# Patient Record
Sex: FEMALE | Race: WHITE | NOT HISPANIC OR LATINO | URBAN - METROPOLITAN AREA
[De-identification: names, ages, dates, MRNs, and addresses within clinical notes are randomized per-mention and may not be internally consistent; named-entity substitution may affect disease eponyms.]

---

## 2020-06-01 ENCOUNTER — INPATIENT (INPATIENT)
Facility: HOSPITAL | Age: 85
LOS: 3 days | Discharge: HOME | DRG: 291 | End: 2020-06-05
Attending: GENERAL ACUTE CARE HOSPITAL | Admitting: STUDENT IN AN ORGANIZED HEALTH CARE EDUCATION/TRAINING PROGRAM
Payer: MEDICARE

## 2020-06-01 VITALS
DIASTOLIC BLOOD PRESSURE: 77 MMHG | HEART RATE: 92 BPM | WEIGHT: 145.06 LBS | RESPIRATION RATE: 16 BRPM | HEIGHT: 63 IN | TEMPERATURE: 99 F | SYSTOLIC BLOOD PRESSURE: 122 MMHG | OXYGEN SATURATION: 100 %

## 2020-06-01 DIAGNOSIS — E87.5 HYPERKALEMIA: ICD-10-CM

## 2020-06-01 LAB
ALBUMIN SERPL ELPH-MCNC: 3.6 G/DL — SIGNIFICANT CHANGE UP (ref 3.3–5.2)
ALP SERPL-CCNC: 84 U/L — SIGNIFICANT CHANGE UP (ref 40–120)
ALT FLD-CCNC: 22 U/L — SIGNIFICANT CHANGE UP
ANION GAP SERPL CALC-SCNC: 10 MMOL/L — SIGNIFICANT CHANGE UP (ref 5–17)
APTT BLD: 29.6 SEC — SIGNIFICANT CHANGE UP (ref 27.5–36.3)
AST SERPL-CCNC: 25 U/L — SIGNIFICANT CHANGE UP
BASOPHILS # BLD AUTO: 0.03 K/UL — SIGNIFICANT CHANGE UP (ref 0–0.2)
BASOPHILS NFR BLD AUTO: 0.5 % — SIGNIFICANT CHANGE UP (ref 0–2)
BILIRUB SERPL-MCNC: 0.3 MG/DL — LOW (ref 0.4–2)
BUN SERPL-MCNC: 54 MG/DL — HIGH (ref 8–20)
CALCIUM SERPL-MCNC: 8.8 MG/DL — SIGNIFICANT CHANGE UP (ref 8.6–10.2)
CHLORIDE SERPL-SCNC: 108 MMOL/L — HIGH (ref 98–107)
CO2 SERPL-SCNC: 26 MMOL/L — SIGNIFICANT CHANGE UP (ref 22–29)
CREAT SERPL-MCNC: 2.32 MG/DL — HIGH (ref 0.5–1.3)
CRP SERPL-MCNC: 0.85 MG/DL — HIGH (ref 0–0.4)
EOSINOPHIL # BLD AUTO: 0.26 K/UL — SIGNIFICANT CHANGE UP (ref 0–0.5)
EOSINOPHIL NFR BLD AUTO: 4.7 % — SIGNIFICANT CHANGE UP (ref 0–6)
FERRITIN SERPL-MCNC: 51 NG/ML — SIGNIFICANT CHANGE UP (ref 15–150)
GLUCOSE SERPL-MCNC: 99 MG/DL — SIGNIFICANT CHANGE UP (ref 70–99)
HCT VFR BLD CALC: 32.3 % — LOW (ref 34.5–45)
HGB BLD-MCNC: 9.5 G/DL — LOW (ref 11.5–15.5)
IMM GRANULOCYTES NFR BLD AUTO: 0.4 % — SIGNIFICANT CHANGE UP (ref 0–1.5)
INR BLD: 1.54 RATIO — HIGH (ref 0.88–1.16)
LACTATE BLDV-MCNC: 1.1 MMOL/L — SIGNIFICANT CHANGE UP (ref 0.5–2)
LYMPHOCYTES # BLD AUTO: 1.06 K/UL — SIGNIFICANT CHANGE UP (ref 1–3.3)
LYMPHOCYTES # BLD AUTO: 19.2 % — SIGNIFICANT CHANGE UP (ref 13–44)
MCHC RBC-ENTMCNC: 29.4 GM/DL — LOW (ref 32–36)
MCHC RBC-ENTMCNC: 30.2 PG — SIGNIFICANT CHANGE UP (ref 27–34)
MCV RBC AUTO: 102.5 FL — HIGH (ref 80–100)
MONOCYTES # BLD AUTO: 0.66 K/UL — SIGNIFICANT CHANGE UP (ref 0–0.9)
MONOCYTES NFR BLD AUTO: 11.9 % — SIGNIFICANT CHANGE UP (ref 2–14)
NEUTROPHILS # BLD AUTO: 3.5 K/UL — SIGNIFICANT CHANGE UP (ref 1.8–7.4)
NEUTROPHILS NFR BLD AUTO: 63.3 % — SIGNIFICANT CHANGE UP (ref 43–77)
NT-PROBNP SERPL-SCNC: HIGH PG/ML (ref 0–300)
PLATELET # BLD AUTO: 177 K/UL — SIGNIFICANT CHANGE UP (ref 150–400)
POTASSIUM SERPL-MCNC: 6.4 MMOL/L — CRITICAL HIGH (ref 3.5–5.3)
POTASSIUM SERPL-SCNC: 6.4 MMOL/L — CRITICAL HIGH (ref 3.5–5.3)
PROCALCITONIN SERPL-MCNC: 0.1 NG/ML — SIGNIFICANT CHANGE UP (ref 0.02–0.1)
PROT SERPL-MCNC: 6.8 G/DL — SIGNIFICANT CHANGE UP (ref 6.6–8.7)
PROTHROM AB SERPL-ACNC: 17.6 SEC — HIGH (ref 10–12.9)
RBC # BLD: 3.15 M/UL — LOW (ref 3.8–5.2)
RBC # FLD: 17.5 % — HIGH (ref 10.3–14.5)
SODIUM SERPL-SCNC: 143 MMOL/L — SIGNIFICANT CHANGE UP (ref 135–145)
TROPONIN T SERPL-MCNC: 0.04 NG/ML — SIGNIFICANT CHANGE UP (ref 0–0.06)
WBC # BLD: 5.53 K/UL — SIGNIFICANT CHANGE UP (ref 3.8–10.5)
WBC # FLD AUTO: 5.53 K/UL — SIGNIFICANT CHANGE UP (ref 3.8–10.5)

## 2020-06-01 PROCEDURE — 71045 X-RAY EXAM CHEST 1 VIEW: CPT | Mod: 26

## 2020-06-01 PROCEDURE — 93010 ELECTROCARDIOGRAM REPORT: CPT

## 2020-06-01 PROCEDURE — 99223 1ST HOSP IP/OBS HIGH 75: CPT | Mod: GC

## 2020-06-01 PROCEDURE — 99497 ADVNCD CARE PLAN 30 MIN: CPT

## 2020-06-01 PROCEDURE — 99291 CRITICAL CARE FIRST HOUR: CPT | Mod: CS

## 2020-06-01 RX ORDER — AZITHROMYCIN 500 MG/1
500 TABLET, FILM COATED ORAL ONCE
Refills: 0 | Status: COMPLETED | OUTPATIENT
Start: 2020-06-01 | End: 2020-06-01

## 2020-06-01 RX ORDER — INSULIN HUMAN 100 [IU]/ML
10 INJECTION, SOLUTION SUBCUTANEOUS ONCE
Refills: 0 | Status: COMPLETED | OUTPATIENT
Start: 2020-06-01 | End: 2020-06-01

## 2020-06-01 RX ORDER — DEXTROSE 50 % IN WATER 50 %
50 SYRINGE (ML) INTRAVENOUS ONCE
Refills: 0 | Status: COMPLETED | OUTPATIENT
Start: 2020-06-01 | End: 2020-06-01

## 2020-06-01 RX ORDER — SODIUM BICARBONATE 1 MEQ/ML
50 SYRINGE (ML) INTRAVENOUS ONCE
Refills: 0 | Status: COMPLETED | OUTPATIENT
Start: 2020-06-01 | End: 2020-06-01

## 2020-06-01 RX ORDER — CEFTRIAXONE 500 MG/1
1000 INJECTION, POWDER, FOR SOLUTION INTRAMUSCULAR; INTRAVENOUS ONCE
Refills: 0 | Status: COMPLETED | OUTPATIENT
Start: 2020-06-01 | End: 2020-06-01

## 2020-06-01 RX ORDER — FUROSEMIDE 40 MG
40 TABLET ORAL ONCE
Refills: 0 | Status: COMPLETED | OUTPATIENT
Start: 2020-06-01 | End: 2020-06-01

## 2020-06-01 RX ORDER — ACETAMINOPHEN 500 MG
650 TABLET ORAL EVERY 6 HOURS
Refills: 0 | Status: DISCONTINUED | OUTPATIENT
Start: 2020-06-01 | End: 2020-06-05

## 2020-06-01 RX ORDER — ACETAMINOPHEN 500 MG
975 TABLET ORAL ONCE
Refills: 0 | Status: COMPLETED | OUTPATIENT
Start: 2020-06-01 | End: 2020-06-01

## 2020-06-01 RX ADMIN — Medication 40 MILLIGRAM(S): at 22:47

## 2020-06-01 RX ADMIN — CEFTRIAXONE 1000 MILLIGRAM(S): 500 INJECTION, POWDER, FOR SOLUTION INTRAMUSCULAR; INTRAVENOUS at 22:47

## 2020-06-01 RX ADMIN — CEFTRIAXONE 100 MILLIGRAM(S): 500 INJECTION, POWDER, FOR SOLUTION INTRAMUSCULAR; INTRAVENOUS at 22:40

## 2020-06-01 RX ADMIN — INSULIN HUMAN 10 UNIT(S): 100 INJECTION, SOLUTION SUBCUTANEOUS at 22:48

## 2020-06-01 RX ADMIN — Medication 975 MILLIGRAM(S): at 21:44

## 2020-06-01 RX ADMIN — Medication 50 MILLILITER(S): at 22:49

## 2020-06-01 RX ADMIN — Medication 50 MILLIEQUIVALENT(S): at 22:49

## 2020-06-01 RX ADMIN — AZITHROMYCIN 255 MILLIGRAM(S): 500 TABLET, FILM COATED ORAL at 21:44

## 2020-06-01 RX ADMIN — AZITHROMYCIN 500 MILLIGRAM(S): 500 TABLET, FILM COATED ORAL at 22:39

## 2020-06-01 NOTE — H&P ADULT - ATTENDING COMMENTS
89yoF hx dementia Afib on Eliquis, hypothyroidism, HLD sent from [facility] for CXR concerning for PNA which appears to have been done after pt had been complaining of dyspnea for several days.  Pt reports chronic bilateral leg swelling but is unclear if its been worsening.  Pt reports being on water pills long ago, unsure when or why it was discontinued.  She does not recall any prior hx of CKD. Pt found to be hypoxic on admission at 83%, with elevated proBNP signs of pulmonary edema on CXR.  No fever, leukocytosis and high procal to support PNA.   Labs notable for K+ 6.4 w/out EKG changes; in ED received empiric CAP coverage, IV insulin, IV Lasix, NaHCO3.    Admit for acute hypoxemic respiratory failure due to decompensated CHF, unknown subtype.  Plan for IV diuresis, TTE, cardiology evaluation.  Also hyperkalemia in setting of MONA on presumed advanced CKD.  Nephrology consulted given presumed MONA on likely CKD.  Also consulted palliative as pt to help clarify advanced directives.  Pt repeatedly deferring to melvin Buenrostro ([phone]) who PGY3 called by unable to reach overnight.     5AM update: Repeat STAT BMP had been ordered ~11:50PM on 6/1, communicated to ED RN Jeff Dia but there had been multiple delays in obtaining it so escalated to ED RN manager and called for critical result of 6.5 after 5AM. Will give Ca gluconate, insulin (humulin at 5U given serum glucose of 67), dextrose, Kayexalate, albuterol.  Repeat BMP ordered for 9AM. 89yoF hx dementia Afib on Eliquis, hypothyroidism, HLD sent from [facility] for CXR concerning for PNA which appears to have been done after pt had been complaining of dyspnea for several days.  Pt reports chronic bilateral leg swelling but is unclear if its been worsening.  Pt reports being on water pills long ago, unsure when or why it was discontinued.  She does not recall any prior hx of CKD. Pt found to be hypoxic on admission at 83%, with elevated proBNP signs of pulmonary edema on CXR.  No fever, leukocytosis and high procal to support PNA.   Labs notable for K+ 6.4 w/out EKG changes; in ED received empiric CAP coverage, IV insulin, IV Lasix, NaHCO3.    Admit for acute hypoxemic respiratory failure due to decompensated CHF, unknown subtype.  Plan for IV diuresis, TTE, cardiology evaluation.  Also hyperkalemia in setting of MONA on presumed advanced CKD.  Nephrology consulted given presumed MONA on likely CKD.  Also consulted palliative as pt to help clarify advanced directives.  Pt does not feel she can make her own decision regarding code status and repeatedly deferring to melvin Buenrostro ([phone]) who PGY3 called by unable to reach overnight.     5AM update: Repeat STAT BMP had been ordered ~11:50PM on 6/1, communicated to ED RN Jeff Dia but there had been multiple delays in obtaining it so escalated to ED RN manager and called for critical result of K+6.5 after 5AM. Will give Ca gluconate, insulin (humulin at 5U given serum glucose of 67), dextrose, Kayexalate, albuterol.  Repeat BMP ordered for 9AM.

## 2020-06-01 NOTE — H&P ADULT - REASON FOR ADMISSION
SOB acute hypoxemic respiratory failure due to decompensated CHF acute hypoxemic respiratory failure due to decompensated CHF, hyperkalemia

## 2020-06-01 NOTE — H&P ADULT - NSHPPHYSICALEXAM_GEN_ALL_CORE
Vital Signs Last 24 Hrs  T(C): 36.3 (01 Jun 2020 21:44), Max: 37.2 (01 Jun 2020 20:52)  T(F): 97.4 (01 Jun 2020 21:44), Max: 99 (01 Jun 2020 20:52)  HR: 92 (01 Jun 2020 20:52) (92 - 92)  BP: 122/77 (01 Jun 2020 20:52) (122/77 - 122/77)  RR: 20 (01 Jun 2020 21:35) (16 - 20)  SpO2: 97% (01 Jun 2020 21:35) (83% - 100%)    GENERAL: NAD, well-groomed, well-developed  HEAD:  Atraumatic, Normocephalic  EYES: PERRLA, conjunctiva and sclera clear  ENMT: No tonsillar erythema, exudates, or enlargement, dry mucous   NECK: Supple  Respiratory: positive rales BL over both lung fields , no  rhonchi, wheezing, or rubs  CV: Irregular - Irregular ,  No murmurs, rubs, or gallops  Gastrointestinal: Soft, Nontender, Nondistended; Bowel sounds present  EXTREMITIES:  2+ Peripheral Pulses , + 2 edema over LE up to the knees   NERVOUS SYSTEM:  Alert & Oriented X2, Motor Strength 5/5 B/L upper and lower extremities  SKIN: sacral ulcer stage 2 Vital Signs Last 24 Hrs  T(C): 36.3 (01 Jun 2020 21:44), Max: 37.2 (01 Jun 2020 20:52)  T(F): 97.4 (01 Jun 2020 21:44), Max: 99 (01 Jun 2020 20:52)  HR: 92 (01 Jun 2020 20:52) (92 - 92)  BP: 122/77 (01 Jun 2020 20:52) (122/77 - 122/77)  RR: 20 (01 Jun 2020 21:35) (16 - 20)  SpO2: 97% (01 Jun 2020 21:35) (83% - 100%)    GENERAL: NAD, well-groomed, well-developed  HEAD:  Atraumatic, Normocephalic  EYES: PERRLA, conjunctiva and sclera clear  ENMT: No tonsillar erythema, exudates, or enlargement, dry mucous   NECK: Supple  Respiratory: positive rales BL over both lung fields , no  rhonchi, wheezing, or rubs  CV: Irregular - Irregulary ,  No murmurs, rubs, or gallops  Gastrointestinal: Soft, Nontender, Nondistended; Bowel sounds present  EXTREMITIES:  2+ Peripheral Pulses , + 2 edema over LE up to the knees   NERVOUS SYSTEM:  Alert & Oriented X2, Motor Strength 5/5 B/L upper and lower extremities  SKIN: sacral ulcer stage 2

## 2020-06-01 NOTE — H&P ADULT - ASSESSMENT
Pt is a 89 yo F w/pmh of dementia , Afib on eliquis , HLD , hypothyroidism and depression , pt BIBA from [facility] after CXR showing PNA . In the ER pt found to be in hypoxic respiratory failure most likely 2/2 to CHF, hyperkalemia and elevated Cr and BUN. Pt will be admitted for evaluation and treatment .     1-Hypoxic respiratory failure 2/2 to CHF unknown EF   admit to medicine , telem bed  CXR w/ cardiomegaly , congestion and BL small pleural effusion   BNP>1700  positive rales and LE edema on Physical exam   s/p rocephin/azit in the ER   low suspicion for PNA ( wbc and procal wnl and  no fever )  cardiology consulted [facility]  s/p one dose of lasix 40 IV   c/w lasix 40 mg QD  daily wt, fluid restriction   trop nega  TTE ordered     2-Hyperkalemia  s/p IV insulin , D50% , bicar and lasix  repeat BMP ordered .     3-MONA vs CKD  elevated Cr and BUN  unknown renal  baseline   renal US ordered   Nephrology consulted     4-Macrocytic anemia   Hb= 9.5  MCV= 102  will check folate and vit B12 levels     5-Afib   chronic   c/w eliquis 2.5 BID  c/w metoprolol succ    6- Depression   c/w mirtazapine at bed time  PRN temazepam     7-Hypothyroidism   c/w levothy     8- Preventive measure   DVT proph: c/w eliquis BID    Case discuss w/ Attending Pt is a 89 yo F w/pmh of dementia , Afib on eliquis , HLD , hypothyroidism and depression , pt BIBA from [facility] after CXR showing PNA . In the ER pt found to be in hypoxic respiratory failure most likely 2/2 to CHF, hyperkalemia and elevated Cr and BUN. Pt will be admitted for evaluation and treatment .     1-Hypoxic respiratory failure 2/2 to CHF unknown EF   admit to medicine , telem bed  CXR w/ cardiomegaly , congestion and BL small pleural effusion   BNP>1700  positive rales and BL LE edema on Physical exam   s/p rocephin/azit in the ER   low suspicion for PNA ( wbc and procal wnl and  no fever )  cardiology consult   s/p one dose of lasix 40 mg  IV   c/w lasix 40 mg QD  daily wt, fluid restriction , strict in and out   trop nega x 1   TTE ordered     2-Hyperkalemia  s/p IV insulin , D50% , bicar and lasix  repeat BMP ordered .  BMP in the am     3-MONA vs CKD  elevated Cr and BUN  unknown renal baseline   renal US ordered  UA ordered   Nephrology consulted     4-Macrocytic anemia   Hb= 9.5  MCV= 102  will check folate and vit B12 levels     5-Afib   chronic   c/w eliquis 2.5 BID  c/w metoprolol succ    6- Depression   c/w mirtazapine at bed time  PRN temazepam     7-Hypothyroidism   c/w levothy     8- Preventive measure   DVT proph: c/w eliquis BID  Called pt's Nephew Ashwin Buenrostro [phone] no answered, left a voice mail      Case discuss w/ Attending Dr Coon Pt is a 89 yo F w/pmh of dementia , Afib on eliquis , HLD , hypothyroidism and depression , pt BIBA from [facility] after CXR showing PNA . In the ER pt found to be in hypoxic respiratory failure most likely 2/2 to CHF, hyperkalemia and elevated Cr and BUN. Pt will be admitted for evaluation and treatment .     1-Hypoxic respiratory failure 2/2 to CHF unknown EF   admit to medicine , telem bed  CXR w/ cardiomegaly , congestion and BL small pleural effusion   BNP>1700  positive rales and BL LE edema on Physical exam   s/p rocephin/azit in the ER , Bcx collected in the ER   low suspicion for PNA ( wbc and procal wnl and  no fever )  cardiology consult   s/p one dose of lasix 40 mg  IV   c/w lasix 40 mg QD  daily wt, fluid restriction , strict in and out   trop nega x 1   TTE ordered     2-Hyperkalemia  s/p IV insulin , D50% , bicar and lasix  repeat BMP ordered .  BMP in the am     3-MONA vs CKD  elevated Cr and BUN  unknown renal baseline   renal US ordered  daily BMP while in tx w/ lasix , s/p lasix 40 mg in the ER , next dose tomorrow after nephro and cardio evaluation   UA ordered   Nephrology consulted     4-Macrocytic anemia   Hb= 9.5  MCV= 102  will check folate and vit B12 levels     5-Afib   chronic   c/w eliquis 2.5 BID  c/w metoprolol succ    6- Depression   c/w mirtazapine at bed time  PRN temazepam     7-Hypothyroidism   c/w levothy     8- Preventive measure   DVT proph: c/w eliquis BID  Called pt's Nephew Ashwin Porter [phone] no answered, left a voice mail      Case discuss w/ Attending Dr Coon Pt is a 89 yo F w/pmh of dementia , Afib on eliquis , HLD , hypothyroidism and depression , pt BIBA from [facility] after CXR showing PNA . In the ER pt found to be in hypoxic respiratory failure most likely 2/2 to CHF, hyperkalemia and elevated Cr and BUN. Pt will be admitted for evaluation and treatment .     1-Hypoxic respiratory failure 2/2 to CHF unknown EF   admit to medicine , telem bed  CXR w/ cardiomegaly , congestion and BL small pleural effusion   BNP>1700  positive rales and BL LE edema on Physical exam   s/p rocephin/azit in the ER , Bcx collected in the ER   low suspicion for PNA ( wbc and procal wnl and  no fever )  cardiology consult   s/p one dose of lasix 40 mg  IV   c/w lasix 40 mg QD, monitor renal function   daily wt, fluid restriction , strict in and outs   trop nega x 1   TTE ordered     2-Hyperkalemia  s/p IV insulin , D50% , bicar and lasix  STAT repeat BMP ordered .  BMP in the am   ECG : w/ Afib , neg T wave in II - Avf and V1 , no peak T waves     3-MONA vs CKD  elevated Cr and BUN  unknown baseline   renal US ordered  daily BMP while in tx w/ lasix , s/p lasix 40 mg in the ER , next dose tomorrow after nephro and cardio evaluation   UA ordered   Nephrology consulted     4-Macrocytic anemia   Hb= 9.5  MCV= 102  will check folate and vit B12 levels     5-Afib   chronic   c/w eliquis 2.5 BID  c/w metoprolol succ    6- Depression   c/w mirtazapine at bed time  PRN temazepam     7-Hypothyroidism   c/w levothy     8- Preventive measure   DVT proph: c/w eliquis BID  Called pt's Nephew Ashwin Porter [phone] no answered, left a voice mail      Case discuss w/ Attending Dr Coon Pt is a 89 yo F w/pmh of dementia , Afib on eliquis , HLD , hypothyroidism and depression , pt BIBA from [facility] after CXR showing PNA . In the ER pt found to be in hypoxic respiratory failure most likely 2/2 to CHF, hyperkalemia and elevated Cr and BUN. Pt will be admitted for evaluation and treatment .     1-Hypoxic respiratory failure 2/2 to CHF unknown EF   admit to medicine , telem bed  CXR w/ cardiomegaly , congestion and BL small pleural effusion   BNP>1700  positive rales and BL LE edema on Physical exam   s/p rocephin/azit in the ER , Bcx collected in the ER   low suspicion for PNA ( wbc and procal wnl and  no fever )  cardiology consult   s/p one dose of lasix 40 mg  IV   c/w lasix 40 mg QD, monitor renal function   daily wt, fluid restriction , strict in and outs   trop nega x 1   TTE ordered     2-Hyperkalemia  s/p IV insulin , D50% , bicar and lasix  STAT repeat BMP ordered .  BMP in the am   ECG : w/ Afib , neg T wave in II - Avf and V1 , no peak T waves     3-MONA vs CKD  elevated Cr and BUN  unknown baseline   renal US ordered  daily BMP while in tx w/ lasix , s/p lasix 40 mg in the ER , next dose tomorrow after nephro and cardio evaluation   UA ordered   Nephrology consulted     4-Macrocytic anemia   Hb= 9.5  MCV= 102  will check folate and vit B12 levels     5-Afib   chronic   c/w eliquis 2.5 BID  c/w metoprolol succ    6- Depression   c/w mirtazapine at bed time  PRN temazepam     7-Hypothyroidism   c/w levothy     8- Preventive measure   DVT proph: c/w eliquis BID  Called pt's Nephew Ashwin Porter [phone] no answered, left a voice mail    Palliative care consult : pt elderly w/ multiple medical conditions and hx of dementia ,  needs goals of care     Case discuss w/ Attending Dr Coon Pt is a 89 yo F w/pmh of dementia , Afib on eliquis , HLD , hypothyroidism and depression , pt BIBA from [facility] after CXR showing PNA . In the ER pt found to be in hypoxic respiratory failure most likely 2/2 to CHF, hyperkalemia and elevated Cr and BUN. Pt will be admitted for evaluation and treatment .     1-Hypoxic respiratory failure 2/2 to CHF unknown EF   admit to medicine , telem bed  CXR w/ cardiomegaly , congestion and BL small pleural effusion   BNP>1700  positive rales and BL LE edema on Physical exam   s/p rocephin/azit in the ER , Bcx collected in the ER   low suspicion for PNA ( wbc and procal wnl and  no fever )  cardiology consult   s/p one dose of lasix 40 mg  IV   c/w lasix 40 mg QD, monitor renal function   daily wt, fluid restriction , strict in and outs   trop nega x 1   TTE ordered     2-Hyperkalemia  s/p IV insulin , D50% , bicar and lasix  STAT repeat BMP suppose to be done 2 hours after tx but due to delay on blood collection and mislabel of the blood it was done on time .   BMP in the am   ECG : w/ Afib , neg T wave in II - Avf and V1 , no peak T waves     3-MONA vs CKD  elevated Cr and BUN  unknown baseline   renal US ordered  daily BMP while in tx w/ lasix , s/p lasix 40 mg in the ER , next dose tomorrow after nephro and cardio evaluation   UA ordered   Nephrology consulted     4-Macrocytic anemia   Hb= 9.5  MCV= 102  will check folate and vit B12 levels     5-Afib   chronic   c/w eliquis 2.5 BID  c/w metoprolol succ    6- Depression   c/w mirtazapine at bed time  PRN temazepam     7-Hypothyroidism   c/w levothy     8- Preventive measure   DVT proph: c/w eliquis BID  Called pt's Nephew Ashwin Porter [phone] no answered, left a voice mail    Palliative care consult : pt elderly w/ multiple medical conditions and hx of dementia ,  needs goals of care     Case discuss w/ Attending Dr Coon Pt is a 89 yo F w/pmh of dementia , Afib on eliquis , HLD , hypothyroidism and depression , pt BIBA from [facility] after CXR showing PNA . In the ER pt found to be in hypoxic respiratory failure most likely 2/2 to CHF, hyperkalemia and elevated Cr and BUN. Pt will be admitted for evaluation and treatment .     1-Hypoxic respiratory failure 2/2 to CHF unknown EF   admit to medicine , telem bed  CXR w/ cardiomegaly , congestion and BL small pleural effusion   BNP>48781  positive rales and BL LE edema on Physical exam   s/p rocephin/azit in the ER , Bcx collected in the ER   low suspicion for PNA ( wbc and procal wnl and  no fever )  cardiology consult   s/p one dose of lasix 40 mg  IV   c/w lasix 40 mg QD, monitor renal function   daily wt, fluid restriction , strict in and outs   trop nega x 1   TTE ordered     2-Hyperkalemia  s/p IV insulin , D50% , bicar and lasix  STAT repeat BMP ordered (was suppose to be done 2 hours after tx but due to delay on blood collection and mislabel of the blood it wasn't done on time)   BMP in the am   ECG : w/ Afib , neg T wave in II - Avf and V1 , no peak T waves     3-MONA vs CKD  elevated Cr and BUN  unknown baseline   renal US ordered  daily BMP while in tx w/ lasix , s/p lasix 40 mg in the ER , next dose tomorrow after nephro and cardio evaluation   UA ordered   Nephrology consulted     4-Macrocytic anemia   Hb= 9.5  MCV= 102  will check folate and vit B12 levels     5-Afib   chronic   c/w eliquis 2.5 BID  c/w metoprolol succ    6- Depression   c/w mirtazapine at bed time  PRN temazepam     7-Hypothyroidism   c/w levothy     8- Preventive measure   DVT proph: c/w eliquis BID  Called pt's Nephjuan m Buenrostro [phone] no answered, left a voice mail    Palliative care consult : pt elderly w/ multiple medical conditions and hx of dementia ,  needs goals of care     Case discuss w/ Attending Dr Coon Pt is a 89 yo F w/pmh of dementia , Afib on eliquis , HLD , hypothyroidism and depression , pt BIBA from [facility] after CXR showing PNA . In the ER pt found to be in hypoxic respiratory failure most likely 2/2 to CHF, hyperkalemia and elevated Cr and BUN. Pt will be admitted for evaluation and treatment .     1-Hypoxic respiratory failure 2/2 to CHF unknown EF   admit to medicine , telem bed  CXR w/ cardiomegaly , congestion and BL small pleural effusion   BNP>25231  positive rales and BL LE edema on Physical exam   s/p rocephin/azit in the ER , Bcx collected in the ER   low suspicion for PNA ( wbc and procal wnl and  no fever )  cardiology consult   s/p one dose of lasix 40 mg  IV   c/w lasix 40 mg QD, monitor renal function   daily wt, fluid restriction , strict in and outs   trop nega x 1   TTE ordered     2-Hyperkalemia  s/p IV insulin , D50% , bicar and lasix  STAT repeat BMP ordered (was suppose to be done 2 hours after tx but due to delay on blood collection and mislabel of the blood it wasn't done on time)   BMP in the am   ECG : w/ Afib , neg T wave in II - Avf and V1 , no peak T waves     3-MONA vs CKD  elevated Cr and BUN  unknown baseline   renal US ordered  daily BMP while in tx w/ lasix , s/p lasix 40 mg in the ER , next dose tomorrow after nephro and cardio evaluation   UA ordered   Nephrology consulted     4-Macrocytic anemia   Hb= 9.5  MCV= 102  will check folate and vit B12 levels     5-Afib   chronic   c/w eliquis 2.5 BID  c/w metoprolol succinate     6- Depression   c/w mirtazapine at bed time  PRN temazepam     7-Hypothyroidism   c/w levothyroxine     8- Preventive measure   DVT proph: c/w eliquis BID  Called pt's Nephew Ashwin Buenrostro [phone] no answered, left a voice mail    Palliative care consult : pt elderly w/ multiple medical conditions and hx of dementia ,  needs goals of care     Case discuss w/ Attending Dr Coon

## 2020-06-01 NOTE — ED PROVIDER NOTE - OBJECTIVE STATEMENT
89 y/o F pt with significant PMHx of Dementia presents to the ED c/o SOB. Pt was sent in from [facility] due to an x-ray that showed pneumonia. Overall she feels fine but does have complaints of wheezing and difficulty speaking secondary to the SOB. No further complaints at this time.

## 2020-06-01 NOTE — ED PROVIDER NOTE - CARE PLAN
Principal Discharge DX:	Hyperkalemia  Secondary Diagnosis:	Acute renal failure, unspecified acute renal failure type  Secondary Diagnosis:	Hypoxia

## 2020-06-01 NOTE — H&P ADULT - NSHPSOCIALHISTORY_GEN_ALL_CORE
hx of smoking for many yrs , denies alcohol or drugs   Pt is from [facility] , use a walker and cane to ambulate

## 2020-06-01 NOTE — ED PROVIDER NOTE - CONSTITUTIONAL, MLM
normal... Well appearing, awake, alert, oriented to person, location and situation and in no apparent distress.

## 2020-06-01 NOTE — ED PROVIDER NOTE - PROGRESS NOTE DETAILS
Pt taken off supplemental oxygen and within 5 minutes dropped to 85%, however, when placed on 3L returns to greater than 96%.

## 2020-06-01 NOTE — H&P ADULT - NSICDXPASTMEDICALHX_GEN_ALL_CORE_FT
PAST MEDICAL HISTORY:  Atrial fibrillation     Dementia     Depression     Hyperlipidemia     Hypothyroidism

## 2020-06-01 NOTE — ED ADULT NURSE REASSESSMENT NOTE - NS ED NURSE REASSESS COMMENT FT1
dime sized stage 2 ulcer noticed on upper middle cocyx area, Dr aware, dressed area. will cont to monitor

## 2020-06-01 NOTE — H&P ADULT - NSHPLABSRESULTS_GEN_ALL_CORE
9.5    5.53  )-----------( 177      ( 01 Jun 2020 21:28 )             32.3       06-01    143  |  108<H>  |  54.0<H>  ----------------------------<  99  6.4<HH>   |  26.0  |  2.32<H>    Ca    8.8      01 Jun 2020 21:28    TPro  6.8  /  Alb  3.6  /  TBili  0.3<L>  /  DBili  x   /  AST  25  /  ALT  22  /  AlkPhos  84  06-01      Troponin T, Serum (06.01.20 @ 21:28)    Troponin T, Serum: 0.04: Reference Interval for Troponin T  Less than 0.06 ng/mL - includes the 99th percentile of a healthy  population at a method C.V. of 10% or less.  NOTE: Troponin T is measured by the Roche CLIA method and these  results are not interchangable with Troponin I results since they are  different assays with different reference intervals. ng/mL    Procalcitonin, Serum (06.01.20 @ 21:28)    Procalcitonin, Serum: 0.10: Hemolyzed. Interpret wth caution  Reference range change as of 3/21/2019 ng/mL

## 2020-06-01 NOTE — ED ADULT NURSE NOTE - CHIEF COMPLAINT QUOTE
Pt A&Ox2,  BIBA from the [facility], was sent for xray that showed pneumonia. Patient has history of dementia,.

## 2020-06-01 NOTE — ED PROVIDER NOTE - CARDIAC, MLM
Normal rate, irregular rhythm.  Heart sounds S1, S2.  No murmurs, rubs or gallops. 2+ pitting edema to the IRIS lower extremities.

## 2020-06-01 NOTE — ED ADULT TRIAGE NOTE - CHIEF COMPLAINT QUOTE
Pt A&Ox2,  BIBA from the [facility], was sent for xray that showed pneumonia. Patient has history of dementia,.

## 2020-06-01 NOTE — H&P ADULT - HISTORY OF PRESENT ILLNESS
Pt is a 89 yo F w/pmh of dementia , Afib on eliquis , HLD , hypothyroidism and depression , pt BIBA from [facility] after CXR showing PNA . Pt report SOB but overall denies any other sx such as chest pain , n/v , diarrhea , fever, cough , calf pain or headache . In the ER pt was found to be hypoxic down ot 86 % , placed on NC 3 lit sat 97 % . Her initial lab showed K level of 6.4 for which she was tx w/ D50%, laxix, bicarbonate, and IV insulin , she also had MONA vs possible CKD , unknown baseline . S/P rocephin and azith in the ER due to CXR w/ concern for PNA . Pt report LE edema which is worse , denies any worsening of the swelling , she use a walker and a cane to ambulated  . Pt is a 89 yo F w/pmh of dementia , Afib on eliquis , HLD , hypothyroidism and depression , pt BIBA from [facility] after CXR showing possible  PNA . Pt report SOB but overall denies any other sx such as chest pain , n/v , diarrhea , fever, cough , calf pain or headache . In the ER pt was found to be hypoxic down ot 86 % , placed on NC 3 lit, now  sat 97 % . Her initial lab showed K level of 6.4 for which she was tx w/ D50%, lasix , bicarbonate, and IV insulin , she also has elevated Cr and BUN consistent w/  MONA vs possible CKD , unknown baseline . S/P rocephin and azith in the ER due to CXR w/ concern for PNA . Pt report chronic LE edema , denies any worsening of the swelling , she use a walker and a cane to ambulated. Pt is a 89 yo F w/pmh of dementia , Afib on eliquis , HLD , hypothyroidism and depression , pt BIBA from [facility] after CXR showing possible  PNA . Pt report SOB but overall denies any other sx such as chest pain , n/v , diarrhea , fever, cough , calf pain or headache . In the ER pt was found to be hypoxic down ot 83 % , placed on NC 3 lit, now  sat 97 % . Her initial lab showed K level of 6.4 for which she was tx w/ D50%, lasix , bicarbonate, and IV insulin , she also has elevated Cr and BUN consistent w/  MONA vs possible CKD , unknown baseline . S/P rocephin and azith in the ER due to CXR w/ concern for PNA . Pt report chronic LE edema , denies any worsening of the swelling , she use a walker and a cane to ambulated.

## 2020-06-02 DIAGNOSIS — I50.9 HEART FAILURE, UNSPECIFIED: ICD-10-CM

## 2020-06-02 DIAGNOSIS — I48.20 CHRONIC ATRIAL FIBRILLATION, UNSPECIFIED: ICD-10-CM

## 2020-06-02 DIAGNOSIS — E87.5 HYPERKALEMIA: ICD-10-CM

## 2020-06-02 LAB
ANION GAP SERPL CALC-SCNC: 10 MMOL/L — SIGNIFICANT CHANGE UP (ref 5–17)
ANION GAP SERPL CALC-SCNC: 11 MMOL/L — SIGNIFICANT CHANGE UP (ref 5–17)
ANION GAP SERPL CALC-SCNC: 12 MMOL/L — SIGNIFICANT CHANGE UP (ref 5–17)
APPEARANCE UR: CLEAR — SIGNIFICANT CHANGE UP
BACTERIA # UR AUTO: ABNORMAL
BILIRUB UR-MCNC: NEGATIVE — SIGNIFICANT CHANGE UP
BUN SERPL-MCNC: 56 MG/DL — HIGH (ref 8–20)
BUN SERPL-MCNC: 58 MG/DL — HIGH (ref 8–20)
BUN SERPL-MCNC: 59 MG/DL — HIGH (ref 8–20)
CALCIUM SERPL-MCNC: 8.4 MG/DL — LOW (ref 8.6–10.2)
CALCIUM SERPL-MCNC: 8.7 MG/DL — SIGNIFICANT CHANGE UP (ref 8.6–10.2)
CALCIUM SERPL-MCNC: 8.8 MG/DL — SIGNIFICANT CHANGE UP (ref 8.6–10.2)
CHLORIDE SERPL-SCNC: 106 MMOL/L — SIGNIFICANT CHANGE UP (ref 98–107)
CHLORIDE SERPL-SCNC: 107 MMOL/L — SIGNIFICANT CHANGE UP (ref 98–107)
CHLORIDE SERPL-SCNC: 108 MMOL/L — HIGH (ref 98–107)
CO2 SERPL-SCNC: 24 MMOL/L — SIGNIFICANT CHANGE UP (ref 22–29)
CO2 SERPL-SCNC: 25 MMOL/L — SIGNIFICANT CHANGE UP (ref 22–29)
CO2 SERPL-SCNC: 26 MMOL/L — SIGNIFICANT CHANGE UP (ref 22–29)
COLOR SPEC: YELLOW — SIGNIFICANT CHANGE UP
CREAT SERPL-MCNC: 2.32 MG/DL — HIGH (ref 0.5–1.3)
CREAT SERPL-MCNC: 2.38 MG/DL — HIGH (ref 0.5–1.3)
CREAT SERPL-MCNC: 2.45 MG/DL — HIGH (ref 0.5–1.3)
DIFF PNL FLD: ABNORMAL
EPI CELLS # UR: SIGNIFICANT CHANGE UP
FOLATE SERPL-MCNC: 14.1 NG/ML — SIGNIFICANT CHANGE UP
GLUCOSE BLDC GLUCOMTR-MCNC: 101 MG/DL — HIGH (ref 70–99)
GLUCOSE SERPL-MCNC: 102 MG/DL — HIGH (ref 70–99)
GLUCOSE SERPL-MCNC: 64 MG/DL — LOW (ref 70–99)
GLUCOSE SERPL-MCNC: 67 MG/DL — LOW (ref 70–99)
GLUCOSE UR QL: NEGATIVE MG/DL — SIGNIFICANT CHANGE UP
KETONES UR-MCNC: NEGATIVE — SIGNIFICANT CHANGE UP
LEUKOCYTE ESTERASE UR-ACNC: ABNORMAL
MAGNESIUM SERPL-MCNC: 2.5 MG/DL — SIGNIFICANT CHANGE UP (ref 1.6–2.6)
NITRITE UR-MCNC: NEGATIVE — SIGNIFICANT CHANGE UP
PH UR: 5 — SIGNIFICANT CHANGE UP (ref 5–8)
PHOSPHATE SERPL-MCNC: 5.1 MG/DL — HIGH (ref 2.4–4.7)
PHOSPHATE SERPL-MCNC: 5.6 MG/DL — HIGH (ref 2.4–4.7)
POTASSIUM SERPL-MCNC: 4.8 MMOL/L — SIGNIFICANT CHANGE UP (ref 3.5–5.3)
POTASSIUM SERPL-MCNC: 5.9 MMOL/L — HIGH (ref 3.5–5.3)
POTASSIUM SERPL-MCNC: 6.5 MMOL/L — CRITICAL HIGH (ref 3.5–5.3)
POTASSIUM SERPL-SCNC: 4.8 MMOL/L — SIGNIFICANT CHANGE UP (ref 3.5–5.3)
POTASSIUM SERPL-SCNC: 5.9 MMOL/L — HIGH (ref 3.5–5.3)
POTASSIUM SERPL-SCNC: 6.5 MMOL/L — CRITICAL HIGH (ref 3.5–5.3)
PROT UR-MCNC: 15 MG/DL
RBC CASTS # UR COMP ASSIST: NEGATIVE /HPF — SIGNIFICANT CHANGE UP (ref 0–4)
SARS-COV-2 RNA SPEC QL NAA+PROBE: SIGNIFICANT CHANGE UP
SODIUM SERPL-SCNC: 142 MMOL/L — SIGNIFICANT CHANGE UP (ref 135–145)
SODIUM SERPL-SCNC: 142 MMOL/L — SIGNIFICANT CHANGE UP (ref 135–145)
SODIUM SERPL-SCNC: 145 MMOL/L — SIGNIFICANT CHANGE UP (ref 135–145)
SP GR SPEC: 1.01 — SIGNIFICANT CHANGE UP (ref 1.01–1.02)
UROBILINOGEN FLD QL: NEGATIVE MG/DL — SIGNIFICANT CHANGE UP
VIT B12 SERPL-MCNC: 437 PG/ML — SIGNIFICANT CHANGE UP (ref 232–1245)
WBC UR QL: SIGNIFICANT CHANGE UP

## 2020-06-02 PROCEDURE — 99223 1ST HOSP IP/OBS HIGH 75: CPT

## 2020-06-02 PROCEDURE — 99233 SBSQ HOSP IP/OBS HIGH 50: CPT

## 2020-06-02 PROCEDURE — 99498 ADVNCD CARE PLAN ADDL 30 MIN: CPT

## 2020-06-02 PROCEDURE — 99497 ADVNCD CARE PLAN 30 MIN: CPT | Mod: 25

## 2020-06-02 PROCEDURE — 76775 US EXAM ABDO BACK WALL LIM: CPT | Mod: 26

## 2020-06-02 RX ORDER — DEXTROSE 50 % IN WATER 50 %
50 SYRINGE (ML) INTRAVENOUS ONCE
Refills: 0 | Status: COMPLETED | OUTPATIENT
Start: 2020-06-02 | End: 2020-06-02

## 2020-06-02 RX ORDER — INSULIN HUMAN 100 [IU]/ML
5 INJECTION, SOLUTION SUBCUTANEOUS ONCE
Refills: 0 | Status: COMPLETED | OUTPATIENT
Start: 2020-06-02 | End: 2020-06-02

## 2020-06-02 RX ORDER — SIMVASTATIN 20 MG/1
40 TABLET, FILM COATED ORAL AT BEDTIME
Refills: 0 | Status: DISCONTINUED | OUTPATIENT
Start: 2020-06-02 | End: 2020-06-05

## 2020-06-02 RX ORDER — CALCIUM GLUCONATE 100 MG/ML
1 VIAL (ML) INTRAVENOUS ONCE
Refills: 0 | Status: COMPLETED | OUTPATIENT
Start: 2020-06-02 | End: 2020-06-02

## 2020-06-02 RX ORDER — INSULIN HUMAN 100 [IU]/ML
10 INJECTION, SOLUTION SUBCUTANEOUS ONCE
Refills: 0 | Status: COMPLETED | OUTPATIENT
Start: 2020-06-02 | End: 2020-06-02

## 2020-06-02 RX ORDER — LEVOTHYROXINE SODIUM 125 MCG
100 TABLET ORAL DAILY
Refills: 0 | Status: DISCONTINUED | OUTPATIENT
Start: 2020-06-02 | End: 2020-06-05

## 2020-06-02 RX ORDER — SODIUM ZIRCONIUM CYCLOSILICATE 10 G/10G
10 POWDER, FOR SUSPENSION ORAL EVERY 8 HOURS
Refills: 0 | Status: COMPLETED | OUTPATIENT
Start: 2020-06-02 | End: 2020-06-04

## 2020-06-02 RX ORDER — ASPIRIN/CALCIUM CARB/MAGNESIUM 324 MG
81 TABLET ORAL DAILY
Refills: 0 | Status: DISCONTINUED | OUTPATIENT
Start: 2020-06-02 | End: 2020-06-05

## 2020-06-02 RX ORDER — APIXABAN 2.5 MG/1
1 TABLET, FILM COATED ORAL
Qty: 0 | Refills: 0 | DISCHARGE

## 2020-06-02 RX ORDER — SIMVASTATIN 20 MG/1
1 TABLET, FILM COATED ORAL
Qty: 0 | Refills: 0 | DISCHARGE

## 2020-06-02 RX ORDER — MIRTAZAPINE 45 MG/1
30 TABLET, ORALLY DISINTEGRATING ORAL AT BEDTIME
Refills: 0 | Status: DISCONTINUED | OUTPATIENT
Start: 2020-06-02 | End: 2020-06-05

## 2020-06-02 RX ORDER — TEMAZEPAM 15 MG/1
7.5 CAPSULE ORAL AT BEDTIME
Refills: 0 | Status: DISCONTINUED | OUTPATIENT
Start: 2020-06-02 | End: 2020-06-05

## 2020-06-02 RX ORDER — MIRTAZAPINE 45 MG/1
1 TABLET, ORALLY DISINTEGRATING ORAL
Qty: 0 | Refills: 0 | DISCHARGE

## 2020-06-02 RX ORDER — METOPROLOL TARTRATE 50 MG
1 TABLET ORAL
Qty: 0 | Refills: 0 | DISCHARGE

## 2020-06-02 RX ORDER — FUROSEMIDE 40 MG
40 TABLET ORAL DAILY
Refills: 0 | Status: DISCONTINUED | OUTPATIENT
Start: 2020-06-02 | End: 2020-06-04

## 2020-06-02 RX ORDER — LEVOTHYROXINE SODIUM 125 MCG
1 TABLET ORAL
Qty: 0 | Refills: 0 | DISCHARGE

## 2020-06-02 RX ORDER — ALBUTEROL 90 UG/1
10 AEROSOL, METERED ORAL ONCE
Refills: 0 | Status: COMPLETED | OUTPATIENT
Start: 2020-06-02 | End: 2020-06-02

## 2020-06-02 RX ORDER — APIXABAN 2.5 MG/1
2.5 TABLET, FILM COATED ORAL
Refills: 0 | Status: DISCONTINUED | OUTPATIENT
Start: 2020-06-02 | End: 2020-06-05

## 2020-06-02 RX ORDER — TEMAZEPAM 15 MG/1
1 CAPSULE ORAL
Qty: 0 | Refills: 0 | DISCHARGE

## 2020-06-02 RX ORDER — METOPROLOL TARTRATE 50 MG
100 TABLET ORAL DAILY
Refills: 0 | Status: DISCONTINUED | OUTPATIENT
Start: 2020-06-02 | End: 2020-06-05

## 2020-06-02 RX ORDER — ASPIRIN/CALCIUM CARB/MAGNESIUM 324 MG
1 TABLET ORAL
Qty: 0 | Refills: 0 | DISCHARGE

## 2020-06-02 RX ORDER — SODIUM POLYSTYRENE SULFONATE 4.1 MEQ/G
30 POWDER, FOR SUSPENSION ORAL ONCE
Refills: 0 | Status: COMPLETED | OUTPATIENT
Start: 2020-06-02 | End: 2020-06-02

## 2020-06-02 RX ADMIN — APIXABAN 2.5 MILLIGRAM(S): 2.5 TABLET, FILM COATED ORAL at 18:35

## 2020-06-02 RX ADMIN — Medication 100 GRAM(S): at 05:50

## 2020-06-02 RX ADMIN — MIRTAZAPINE 30 MILLIGRAM(S): 45 TABLET, ORALLY DISINTEGRATING ORAL at 21:10

## 2020-06-02 RX ADMIN — INSULIN HUMAN 10 UNIT(S): 100 INJECTION, SOLUTION SUBCUTANEOUS at 15:01

## 2020-06-02 RX ADMIN — Medication 81 MILLIGRAM(S): at 10:09

## 2020-06-02 RX ADMIN — Medication 100 MILLIGRAM(S): at 06:04

## 2020-06-02 RX ADMIN — SODIUM POLYSTYRENE SULFONATE 30 GRAM(S): 4.1 POWDER, FOR SUSPENSION ORAL at 05:48

## 2020-06-02 RX ADMIN — ALBUTEROL 10 MILLIGRAM(S): 90 AEROSOL, METERED ORAL at 05:53

## 2020-06-02 RX ADMIN — APIXABAN 2.5 MILLIGRAM(S): 2.5 TABLET, FILM COATED ORAL at 06:04

## 2020-06-02 RX ADMIN — SODIUM ZIRCONIUM CYCLOSILICATE 10 GRAM(S): 10 POWDER, FOR SUSPENSION ORAL at 21:10

## 2020-06-02 RX ADMIN — Medication 40 MILLIGRAM(S): at 13:19

## 2020-06-02 RX ADMIN — INSULIN HUMAN 5 UNIT(S): 100 INJECTION, SOLUTION SUBCUTANEOUS at 05:47

## 2020-06-02 RX ADMIN — Medication 50 MILLILITER(S): at 15:02

## 2020-06-02 RX ADMIN — Medication 100 MICROGRAM(S): at 06:04

## 2020-06-02 RX ADMIN — Medication 50 MILLILITER(S): at 05:47

## 2020-06-02 RX ADMIN — SIMVASTATIN 40 MILLIGRAM(S): 20 TABLET, FILM COATED ORAL at 21:10

## 2020-06-02 RX ADMIN — SODIUM ZIRCONIUM CYCLOSILICATE 10 GRAM(S): 10 POWDER, FOR SUSPENSION ORAL at 13:19

## 2020-06-02 RX ADMIN — Medication 100 GRAM(S): at 15:02

## 2020-06-02 NOTE — PHYSICAL THERAPY INITIAL EVALUATION ADULT - PRECAUTIONS/LIMITATIONS, REHAB EVAL
fall precautions/isolation precautions/oxygen therapy device and L/min/airborne/contact PUI for COVID-19

## 2020-06-02 NOTE — CONSULT NOTE ADULT - REASON FOR ADMISSION
acute hypoxemic respiratory failure due to decompensated CHF, hyperkalemia
acute hypoxemic respiratory failure due to decompensated CHF, hyperkalemia
SOB

## 2020-06-02 NOTE — PHYSICAL THERAPY INITIAL EVALUATION ADULT - IMPAIRMENTS FOUND, PT EVAL
aerobic capacity/endurance/muscle strength/poor safety awareness/gross motor/gait, locomotion, and balance

## 2020-06-02 NOTE — GOALS OF CARE CONVERSATION - ADVANCED CARE PLANNING - CONVERSATION DETAILS
Called and spoke with nephew Julio César to introduce palliative service. He then conference in his cousin Ashwin (who lives locally). Both nephews are actively involved in patient's medical care. Pt was living independently and alone until 1 month ago after having 2 falls within a week at home that prompted the decision to relocate to [facility] assisted living facility. She is ,   passed 15 yrs ago and never had children.     At baseline, pt is mostly independent of ADLs and ambulates with cane/walker. No falls reported since being at [facility] that family is aware of. She occasionally is forgetful but otherwise cognitively is doing well per nephews.     Ashwin was able to clarify that pt has known history of CHF, followed by cardiologist in [place] with last apt ~8 months ago and informed doing well and to return in 1 year. She was discontinued off lasix ~7-10 days ago, family unclear why but presumed it was related to her kidney. Pt also followed by OP nephrologist for known CKD.    Advance Directives reviewed. Julio César shared that he is designated HCP with paperwork at home which he will provide to hospital. There is no living will nor any prior conversations regarding pt's wishes for heroic life sustaining interventions such as CPR or intubation. The nephews at this time have differing opinions. Ashwin feels it would be appropriate to consider a DNR/I whereas Julio César would want a trial of both CPR/intubation. Family agreed that they would want to discuss this with patient in person and hopes to do so when pt returns back to facility. I informed them at this time she will be FULL CODE and all interventions would be provided in the unexpected event of cardiopulmonary arrest. They acknowledged understanding.    GOC at this time is to continue with all medical interventions and monitor for interval improvement. Family hopes for her to return back to facility when medically stable.

## 2020-06-02 NOTE — CONSULT NOTE ADULT - ATTENDING COMMENTS
D/W RN, hospitalist Dr. Mcdonald
pt seen and examined  Poor historian.   Afib on eliquis.  Pt with volume overload and a systolic murmur of aortic valve stenosis, does not appear to be severe on examination  Unsure of chronicity of JAVAN.   Nephrology is consulted.  Agree with diuretics, monitor I/Os  TTE pending  I will follow with you.

## 2020-06-02 NOTE — PHYSICAL THERAPY INITIAL EVALUATION ADULT - DISCHARGE DISPOSITION, PT EVAL
home/return to [facility] with assist, RW and home PT pending progress/home w/ assist/home w/ home PT

## 2020-06-02 NOTE — PHYSICAL THERAPY INITIAL EVALUATION ADULT - PHYSICAL ASSIST/NONPHYSICAL ASSIST: SUPINE/SIT, REHAB EVAL
1 person assist/pt required increased physical assistance to get bilateral LE's out of bed and to help assume proper upright sitting at EOB posture; verbal cues for proper sequence + proper use of bedrails/verbal cues

## 2020-06-02 NOTE — PROGRESS NOTE ADULT - SUBJECTIVE AND OBJECTIVE BOX
CHIEF COMPLAINT/INTERVAL HISTORY:    Patient is a 89y old  Female who presents with a chief complaint of acute hypoxemic respiratory failure due to decompensated CHF, hyperkalemia (2020 12:01)      HPI:  Pt is a 89 yo F w/pmh of dementia , Afib on eliquis , HLD , hypothyroidism and depression , pt BIBA from [facility] after CXR showing possible  PNA . Pt report SOB but overall denies any other sx such as chest pain , n/v , diarrhea , fever, cough , calf pain or headache . In the ER pt was found to be hypoxic down ot 83 % , placed on NC 3 lit, now  sat 97 % . Her initial lab showed K level of 6.4 for which she was tx w/ D50%, lasix , bicarbonate, and IV insulin , she also has elevated Cr and BUN consistent w/  MONA vs possible CKD , unknown baseline . S/P rocephin and azith in the ER due to CXR w/ concern for PNA . Pt report chronic LE edema , denies any worsening of the swelling , she use a walker and a cane to ambulated. (2020 23:53)      SUBJECTIVE & OBJECTIVE: Pt seen and examined at bedside.     ICU Vital Signs Last 24 Hrs  T(C): 36.4 (2020 14:33), Max: 36.6 (2020 07:45)  T(F): 97.5 (2020 14:33), Max: 97.9 (2020 07:45)  HR: 78 (2020 14:33) (70 - 96)  BP: 123/67 (2020 14:33) (114/72 - 157/70)  BP(mean): --  ABP: --  ABP(mean): --  RR: 20 (2020 14:33) (18 - 23)  SpO2: 95% (2020 14:33) (95% - 100%)        MEDICATIONS  (STANDING):  apixaban 2.5 milliGRAM(s) Oral two times a day  aspirin enteric coated 81 milliGRAM(s) Oral daily  furosemide   Injectable 40 milliGRAM(s) IV Push daily  levothyroxine 100 MICROGram(s) Oral daily  metoprolol succinate  milliGRAM(s) Oral daily  mirtazapine 30 milliGRAM(s) Oral at bedtime  simvastatin 40 milliGRAM(s) Oral at bedtime  sodium zirconium cyclosilicate 10 Gram(s) Oral every 8 hours    MEDICATIONS  (PRN):  acetaminophen   Tablet .. 650 milliGRAM(s) Oral every 6 hours PRN Temp greater or equal to 38C (100.4F), Mild Pain (1 - 3)  guaiFENesin  milliGRAM(s) Oral every 12 hours PRN Cough  temazepam 7.5 milliGRAM(s) Oral at bedtime PRN Insomnia      LABS:                        9.5    5.53  )-----------( 177      ( 2020 21:28 )             32.3     06-02    142  |  106  |  56.0<H>  ----------------------------<  64<L>  4.8   |  26.0  |  2.45<H>    Ca    8.7      2020 16:57  Phos  5.6     06-02  Mg     2.5     06-02    TPro  6.8  /  Alb  3.6  /  TBili  0.3<L>  /  DBili  x   /  AST  25  /  ALT  22  /  AlkPhos  84  06-01    PT/INR - ( 2020 21:28 )   PT: 17.6 sec;   INR: 1.54 ratio         PTT - ( 2020 21:28 )  PTT:29.6 sec  Urinalysis Basic - ( 2020 04:08 )    Color: Yellow / Appearance: Clear / S.010 / pH: x  Gluc: x / Ketone: Negative  / Bili: Negative / Urobili: Negative mg/dL   Blood: x / Protein: 15 mg/dL / Nitrite: Negative   Leuk Esterase: Small / RBC: Negative /HPF / WBC 3-5   Sq Epi: x / Non Sq Epi: Few / Bacteria: Occasional        CAPILLARY BLOOD GLUCOSE      POCT Blood Glucose.: 101 mg/dL (2020 05:46)      RECENT CULTURES:      RADIOLOGY & ADDITIONAL TESTS:      PHYSICAL EXAM:    GENERAL: NAD, well-groomed, well-developed  HEAD:  Atraumatic, Normocephalic  EYES: EOMI, PERRLA, conjunctiva and sclera clear  ENMT: Moist mucous membranes  NECK: Supple, No JVD  NERVOUS SYSTEM:  Alert & Oriented X3, Motor Strength 5/5 B/L upper and lower extremities; DTRs 2+ intact and symmetric  CHEST/LUNG: Clear to auscultation bilaterally; No rales, rhonchi, wheezing, or rubs  HEART: Regular rate and rhythm; No murmurs, rubs, or gallops  ABDOMEN: Soft, Nontender, Nondistended; Bowel sounds present  EXTREMITIES:  2+ Peripheral Pulses, No clubbing, cyanosis, or edema        DVT/GI ppx  Discussed with pt @ bedside CHIEF COMPLAINT/INTERVAL HISTORY:    Patient is a 89y old  Female who presents with a chief complaint of acute hypoxemic respiratory failure due to decompensated CHF, hyperkalemia (2020 12:01)    SUBJECTIVE & OBJECTIVE: Pt seen and examined at bedside. No overnight events. Patient reports feeling better today; SOB has improved. Remains afebrile, no leukocytosis. TTE pending.    ROS: No chest pain, palpitations, SOB, light headedness, dizziness, headache, nausea/vomiting, fevers/chills, abdominal pain, dysuria.    ICU Vital Signs Last 24 Hrs  T(C): 36.4 (2020 14:33), Max: 36.6 (2020 07:45)  T(F): 97.5 (2020 14:33), Max: 97.9 (2020 07:45)  HR: 78 (2020 14:33) (70 - 96)  BP: 123/67 (2020 14:33) (114/72 - 157/70)  RR: 20 (2020 14:33) (18 - 23)  SpO2: 95% (2020 14:33) (95% - 100%)    MEDICATIONS  (STANDING):  apixaban 2.5 milliGRAM(s) Oral two times a day  aspirin enteric coated 81 milliGRAM(s) Oral daily  furosemide   Injectable 40 milliGRAM(s) IV Push daily  levothyroxine 100 MICROGram(s) Oral daily  metoprolol succinate  milliGRAM(s) Oral daily  mirtazapine 30 milliGRAM(s) Oral at bedtime  simvastatin 40 milliGRAM(s) Oral at bedtime  sodium zirconium cyclosilicate 10 Gram(s) Oral every 8 hours    MEDICATIONS  (PRN):  acetaminophen   Tablet .. 650 milliGRAM(s) Oral every 6 hours PRN Temp greater or equal to 38C (100.4F), Mild Pain (1 - 3)  guaiFENesin  milliGRAM(s) Oral every 12 hours PRN Cough  temazepam 7.5 milliGRAM(s) Oral at bedtime PRN Insomnia      LABS:                        9.5    5.53  )-----------( 177      ( 2020 21:28 )             32.3     06-02    142  |  106  |  56.0<H>  ----------------------------<  64<L>  4.8   |  26.0  |  2.45<H>    Ca    8.7      2020 16:57  Phos  5.6     06-  Mg     2.5     06-02    TPro  6.8  /  Alb  3.6  /  TBili  0.3<L>  /  DBili  x   /  AST  25  /  ALT  22  /  AlkPhos  84  06-    PT/INR - ( 2020 21:28 )   PT: 17.6 sec;   INR: 1.54 ratio         PTT - ( 2020 21:28 )  PTT:29.6 sec  Urinalysis Basic - ( 2020 04:08 )    Color: Yellow / Appearance: Clear / S.010 / pH: x  Gluc: x / Ketone: Negative  / Bili: Negative / Urobili: Negative mg/dL   Blood: x / Protein: 15 mg/dL / Nitrite: Negative   Leuk Esterase: Small / RBC: Negative /HPF / WBC 3-5   Sq Epi: x / Non Sq Epi: Few / Bacteria: Occasional        CAPILLARY BLOOD GLUCOSE      POCT Blood Glucose.: 101 mg/dL (2020 05:46)      RECENT CULTURES:      RADIOLOGY & ADDITIONAL TESTS:      PHYSICAL EXAM:    GENERAL: elderly female, chronically ill appearing but in no acute distress  HEAD:  Atraumatic, Normocephalic  EYES: EOMI, PERRLA, conjunctiva and sclera clear  ENMT: Moist mucous membranes  NECK: Supple   NERVOUS SYSTEM:  Alert & Oriented X3   CHEST/LUNG: coarse breath sounds  HEART: Regular rate and rhythm; + S1/S2, + murmur  ABDOMEN: Soft, Nontender, Obese, + BS  EXTREMITIES:  ++ LE edema

## 2020-06-02 NOTE — CONSULT NOTE ADULT - SUBJECTIVE AND OBJECTIVE BOX
[facility] DIVISION OF KIDNEY DISEASES AND HYPERTENSION -- INITIAL CONSULT NOTE  --------------------------------------------------------------------------------  HPI:        PAST HISTORY  --------------------------------------------------------------------------------  PAST MEDICAL & SURGICAL HISTORY:  Hypothyroidism  Depression  Dementia  Hyperlipidemia  Atrial fibrillation  No significant past surgical history    FAMILY HISTORY:  No pertinent family history in first degree relatives    PAST SOCIAL HISTORY:    ALLERGIES & MEDICATIONS  --------------------------------------------------------------------------------  Allergies    No Known Allergies    Intolerances      Standing Inpatient Medications  apixaban 2.5 milliGRAM(s) Oral two times a day  aspirin enteric coated 81 milliGRAM(s) Oral daily  furosemide   Injectable 40 milliGRAM(s) IV Push daily  levothyroxine 100 MICROGram(s) Oral daily  metoprolol succinate  milliGRAM(s) Oral daily  mirtazapine 30 milliGRAM(s) Oral at bedtime  simvastatin 40 milliGRAM(s) Oral at bedtime  sodium zirconium cyclosilicate 10 Gram(s) Oral every 8 hours    PRN Inpatient Medications  acetaminophen   Tablet .. 650 milliGRAM(s) Oral every 6 hours PRN  temazepam 7.5 milliGRAM(s) Oral at bedtime PRN      REVIEW OF SYSTEMS  --------------------------------------------------------------------------------  Gen: No weight changes, fatigue, fevers/chills, weakness  Skin: No rashes  Head/Eyes/Ears/Mouth: No headache; Normal hearing; Normal vision w/o blurriness; No sinus pain/discomfort, sore throat  Respiratory: No dyspnea, cough, wheezing, hemoptysis  CV: No chest pain, PND, orthopnea  GI: No abdominal pain, diarrhea, constipation, nausea, vomiting, melena, hematochezia  : No increased frequency, dysuria, hematuria, nocturia  MSK: No joint pain/swelling; no back pain; no edema  Neuro: No dizziness/lightheadedness, weakness, seizures, numbness, tingling  Heme: No easy bruising or bleeding  Endo: No heat/cold intolerance  Psych: No significant nervousness, anxiety, stress, depression    All other systems were reviewed and are negative, except as noted.    VITALS/PHYSICAL EXAM  --------------------------------------------------------------------------------  T(C): 36.6 (06-02-20 @ 07:45), Max: 37.2 (06-01-20 @ 20:52)  HR: 70 (06-02-20 @ 07:45) (70 - 96)  BP: 143/54 (06-02-20 @ 07:45) (114/72 - 157/70)  RR: 23 (06-02-20 @ 07:45) (16 - 23)  SpO2: 97% (06-02-20 @ 07:45) (83% - 100%)  Wt(kg): --  Height (cm): 160.02 (06-01-20 @ 20:52)  Weight (kg): 65.8 (06-01-20 @ 20:52)  BMI (kg/m2): 25.7 (06-01-20 @ 20:52)  BSA (m2): 1.69 (06-01-20 @ 20:52)      Physical Exam:  	Gen: NAD, well-appearing  	HEENT: PERRL, supple neck, clear oropharynx  	Pulm: CTA B/L  	CV: RRR, S1S2; no rub  	Back: No spinal or CVA tenderness; no sacral edema  	Abd: +BS, soft, nontender/nondistended  	: No suprapubic tenderness  	UE: Warm, FROM, no clubbing, intact strength; no edema; no asterixis  	LE: Warm, FROM, no clubbing, intact strength; no edema  	Neuro: No focal deficits, intact gait  	Psych: Normal affect and mood  	Skin: Warm, without rashes  	Vascular access:    LABS/STUDIES  --------------------------------------------------------------------------------              9.5    5.53  >-----------<  177      [06-01-20 @ 21:28]              32.3     145  |  108  |  59.0  ----------------------------<  102      [06-02-20 @ 08:32]  5.9   |  25.0  |  2.38        Ca     8.8     [06-02-20 @ 08:32]      Mg     2.5     [06-02-20 @ 04:21]      Phos  5.6     [06-02-20 @ 04:21]    TPro  6.8  /  Alb  3.6  /  TBili  0.3  /  DBili  x   /  AST  25  /  ALT  22  /  AlkPhos  84  [06-01-20 @ 21:28]    PT/INR: PT 17.6 , INR 1.54       [06-01-20 @ 21:28]  PTT: 29.6       [06-01-20 @ 21:28]    Troponin 0.04      [06-01-20 @ 21:28]    Creatinine Trend:  SCr 2.38 [06-02 @ 08:32]  SCr 2.32 [06-02 @ 04:21]  SCr 2.32 [06-01 @ 21:28]    Urinalysis - [06-02-20 @ 04:08]      Color Yellow / Appearance Clear / SG 1.010 / pH 5.0      Gluc Negative / Ketone Negative  / Bili Negative / Urobili Negative       Blood Moderate / Protein 15 / Leuk Est Small / Nitrite Negative      RBC Negative / WBC 3-5 / Hyaline  / Gran  / Sq Epi  / Non Sq Epi Few / Bacteria Occasional      Ferritin 51      [06-01-20 @ 21:28] [facility] DIVISION OF KIDNEY DISEASES AND HYPERTENSION -- INITIAL CONSULT NOTE  --------------------------------------------------------------------------------  HPI:    ''Pt is a 89 yo F w/pmh of dementia , Afib on eliquis , HLD , hypothyroidism and depression , pt BIBA from [facility] after CXR showing possible  PNA . Pt report SOB but overall denies any other sx such as chest pain , n/v , diarrhea , fever, cough , calf pain or headache . In the ER pt was found to be hypoxic down ot 83 % , placed on NC 3 lit, now  sat 97 % . Her initial lab showed K level of 6.4 for which she was tx w/ D50%, lasix , bicarbonate, and IV insulin , she also has elevated Cr and BUN consistent w/  MONA vs possible CKD , unknown baseline . S/P Rocephin and Azithromycin in the ER due to CXR w/ concern for PNA . Pt report chronic LE edema , denies any worsening of the swelling , she use a walker and a cane to ambulate. ''      Pt admitted for PNA and CHF exacerbation.   Nephrology consulted for Acute kidney injury.    PAST HISTORY  --------------------------------------------------------------------------------  PAST MEDICAL & SURGICAL HISTORY:  Hypothyroidism  Depression  Dementia  Hyperlipidemia  Atrial fibrillation  No significant past surgical history    FAMILY HISTORY:  No pertinent family history in first degree relatives    PAST SOCIAL HISTORY: [facility] resident    ALLERGIES & MEDICATIONS  --------------------------------------------------------------------------------  Allergies    No Known Allergies    Intolerances      Standing Inpatient Medications  apixaban 2.5 milliGRAM(s) Oral two times a day  aspirin enteric coated 81 milliGRAM(s) Oral daily  furosemide   Injectable 40 milliGRAM(s) IV Push daily  levothyroxine 100 MICROGram(s) Oral daily  metoprolol succinate  milliGRAM(s) Oral daily  mirtazapine 30 milliGRAM(s) Oral at bedtime  simvastatin 40 milliGRAM(s) Oral at bedtime  sodium zirconium cyclosilicate 10 Gram(s) Oral every 8 hours    PRN Inpatient Medications  acetaminophen   Tablet .. 650 milliGRAM(s) Oral every 6 hours PRN  temazepam 7.5 milliGRAM(s) Oral at bedtime PRN      REVIEW OF SYSTEMS  --------------------------------------------------------------------------------  Gen: No weight changes, fatigue, fevers/chills, weakness  Skin: No rashes  Head/Eyes/Ears/Mouth: No headache; Normal hearing; Normal vision w/o blurriness; No sinus pain/discomfort, sore throat  Respiratory: No dyspnea, cough, wheezing, hemoptysis  CV: No chest pain, PND, orthopnea  GI: No abdominal pain, diarrhea, constipation, nausea, vomiting, melena, hematochezia  : No increased frequency, dysuria, hematuria, nocturia  MSK: No joint pain/swelling; no back pain; no edema  Neuro: No dizziness/lightheadedness, weakness, seizures, numbness, tingling  Heme: No easy bruising or bleeding  Endo: No heat/cold intolerance  Psych: No significant nervousness, anxiety, stress, depression    All other systems were reviewed and are negative, except as noted.    VITALS/PHYSICAL EXAM  --------------------------------------------------------------------------------  T(C): 36.6 (06-02-20 @ 07:45), Max: 37.2 (06-01-20 @ 20:52)  HR: 70 (06-02-20 @ 07:45) (70 - 96)  BP: 143/54 (06-02-20 @ 07:45) (114/72 - 157/70)  RR: 23 (06-02-20 @ 07:45) (16 - 23)  SpO2: 97% (06-02-20 @ 07:45) (83% - 100%)  Wt(kg): --  Height (cm): 160.02 (06-01-20 @ 20:52)  Weight (kg): 65.8 (06-01-20 @ 20:52)  BMI (kg/m2): 25.7 (06-01-20 @ 20:52)  BSA (m2): 1.69 (06-01-20 @ 20:52)      Physical Exam:  	Gen: NAD, well-appearing  	HEENT: PERRL, supple neck, clear oropharynx  	Pulm: CTA B/L  	CV: RRR, S1S2; no rub  	Back: No spinal or CVA tenderness; no sacral edema  	Abd: +BS, soft, nontender/nondistended  	: No suprapubic tenderness  	UE: Warm, FROM, no clubbing, intact strength; no edema; no asterixis  	LE: Warm, FROM, no clubbing, intact strength; no edema  	Neuro: No focal deficits, intact gait  	Psych: Normal affect and mood  	Skin: Warm, without rashes  	Vascular access:    LABS/STUDIES  --------------------------------------------------------------------------------              9.5    5.53  >-----------<  177      [06-01-20 @ 21:28]              32.3     145  |  108  |  59.0  ----------------------------<  102      [06-02-20 @ 08:32]  5.9   |  25.0  |  2.38        Ca     8.8     [06-02-20 @ 08:32]      Mg     2.5     [06-02-20 @ 04:21]      Phos  5.6     [06-02-20 @ 04:21]    TPro  6.8  /  Alb  3.6  /  TBili  0.3  /  DBili  x   /  AST  25  /  ALT  22  /  AlkPhos  84  [06-01-20 @ 21:28]    PT/INR: PT 17.6 , INR 1.54       [06-01-20 @ 21:28]  PTT: 29.6       [06-01-20 @ 21:28]    Troponin 0.04      [06-01-20 @ 21:28]    Creatinine Trend:  SCr 2.38 [06-02 @ 08:32]  SCr 2.32 [06-02 @ 04:21]  SCr 2.32 [06-01 @ 21:28]    Urinalysis - [06-02-20 @ 04:08]      Color Yellow / Appearance Clear / SG 1.010 / pH 5.0      Gluc Negative / Ketone Negative  / Bili Negative / Urobili Negative       Blood Moderate / Protein 15 / Leuk Est Small / Nitrite Negative      RBC Negative / WBC 3-5 / Hyaline  / Gran  / Sq Epi  / Non Sq Epi Few / Bacteria Occasional      Ferritin 51      [06-01-20 @ 21:28] [facility] DIVISION OF KIDNEY DISEASES AND HYPERTENSION -- INITIAL CONSULT NOTE  --------------------------------------------------------------------------------  HPI:    Pt is a 89 yo F w/pmh of dementia , Afib on eliquis , HLD, hypothyroidism and depression brought in from [facility] after CXR showing possible  PNA. Pt was found to have elevated creatinine and hyperkalemia (K 6.4). CXR showing b/l pleural effusion and vascular congestion. Pt c/o SOB and productive cough with white phlegm, denies fever, chest pain or palpitations. Pt found hypoxemic with SpO2 86% on room air and was placed on nasal cannula. EKG and telemetry showing A-fib. Troponin negative, pro-BNP 17,033. S/P Rocephin and Azithromycin in the ERand also started on lasix.  Nephrology consulted for Acute kidney injury.    PAST HISTORY  --------------------------------------------------------------------------------  PAST MEDICAL & SURGICAL HISTORY:  Hypothyroidism  Depression  Dementia  Hyperlipidemia  Atrial fibrillation  No significant past surgical history    FAMILY HISTORY:  No pertinent family history in first degree relatives    PAST SOCIAL HISTORY: [facility] resident    ALLERGIES & MEDICATIONS  --------------------------------------------------------------------------------  Allergies    No Known Allergies    Intolerances      Standing Inpatient Medications  apixaban 2.5 milliGRAM(s) Oral two times a day  aspirin enteric coated 81 milliGRAM(s) Oral daily  furosemide   Injectable 40 milliGRAM(s) IV Push daily  levothyroxine 100 MICROGram(s) Oral daily  metoprolol succinate  milliGRAM(s) Oral daily  mirtazapine 30 milliGRAM(s) Oral at bedtime  simvastatin 40 milliGRAM(s) Oral at bedtime  sodium zirconium cyclosilicate 10 Gram(s) Oral every 8 hours    PRN Inpatient Medications  acetaminophen   Tablet .. 650 milliGRAM(s) Oral every 6 hours PRN  temazepam 7.5 milliGRAM(s) Oral at bedtime PRN      REVIEW OF SYSTEMS  --------------------------------------------------------------------------------  Gen: No weight changes, fatigue, fevers/chills, weakness  Skin: No rashes  Head/Eyes/Ears/Mouth: No headache; Normal hearing; Normal vision w/o blurriness; No sinus pain/discomfort, sore throat  Respiratory: No dyspnea, cough, wheezing, hemoptysis  CV: No chest pain, PND, orthopnea  GI: No abdominal pain, diarrhea, constipation, nausea, vomiting, melena, hematochezia  : No increased frequency, dysuria, hematuria, nocturia  MSK: No joint pain/swelling; no back pain; no edema  Neuro: No dizziness/lightheadedness, weakness, seizures, numbness, tingling  Heme: No easy bruising or bleeding  Endo: No heat/cold intolerance  Psych: No significant nervousness, anxiety, stress, depression    All other systems were reviewed and are negative, except as noted.    VITALS/PHYSICAL EXAM  --------------------------------------------------------------------------------  T(C): 36.6 (06-02-20 @ 07:45), Max: 37.2 (06-01-20 @ 20:52)  HR: 70 (06-02-20 @ 07:45) (70 - 96)  BP: 143/54 (06-02-20 @ 07:45) (114/72 - 157/70)  RR: 23 (06-02-20 @ 07:45) (16 - 23)  SpO2: 97% (06-02-20 @ 07:45) (83% - 100%)  Wt(kg): --  Height (cm): 160.02 (06-01-20 @ 20:52)  Weight (kg): 65.8 (06-01-20 @ 20:52)  BMI (kg/m2): 25.7 (06-01-20 @ 20:52)  BSA (m2): 1.69 (06-01-20 @ 20:52)      Physical Exam:  	Gen: NAD, well-appearing  	HEENT: PERRL, supple neck, clear oropharynx  	Pulm: CTA B/L  	CV: RRR, S1S2; no rub  	Back: No spinal or CVA tenderness; no sacral edema  	Abd: +BS, soft, nontender/nondistended  	: No suprapubic tenderness  	UE: Warm, FROM, no clubbing, intact strength; no edema; no asterixis  	LE: Warm, FROM, no clubbing, intact strength; no edema  	Neuro: No focal deficits, intact gait  	Psych: Normal affect and mood  	Skin: Warm, without rashes  	Vascular access:    LABS/STUDIES  --------------------------------------------------------------------------------              9.5    5.53  >-----------<  177      [06-01-20 @ 21:28]              32.3     145  |  108  |  59.0  ----------------------------<  102      [06-02-20 @ 08:32]  5.9   |  25.0  |  2.38        Ca     8.8     [06-02-20 @ 08:32]      Mg     2.5     [06-02-20 @ 04:21]      Phos  5.6     [06-02-20 @ 04:21]    TPro  6.8  /  Alb  3.6  /  TBili  0.3  /  DBili  x   /  AST  25  /  ALT  22  /  AlkPhos  84  [06-01-20 @ 21:28]    PT/INR: PT 17.6 , INR 1.54       [06-01-20 @ 21:28]  PTT: 29.6       [06-01-20 @ 21:28]    Troponin 0.04      [06-01-20 @ 21:28]    Creatinine Trend:  SCr 2.38 [06-02 @ 08:32]  SCr 2.32 [06-02 @ 04:21]  SCr 2.32 [06-01 @ 21:28]    Urinalysis - [06-02-20 @ 04:08]      Color Yellow / Appearance Clear / SG 1.010 / pH 5.0      Gluc Negative / Ketone Negative  / Bili Negative / Urobili Negative       Blood Moderate / Protein 15 / Leuk Est Small / Nitrite Negative      RBC Negative / WBC 3-5 / Hyaline  / Gran  / Sq Epi  / Non Sq Epi Few / Bacteria Occasional      Ferritin 51      [06-01-20 @ 21:28] [facility] DIVISION OF KIDNEY DISEASES AND HYPERTENSION -- INITIAL CONSULT NOTE  --------------------------------------------------------------------------------  HPI:    Pt is a 89 yo F w/pmh of dementia , Afib on eliquis , HLD, hypothyroidism and depression brought in from [facility] after CXR showing possible  PNA. Pt was found to have elevated creatinine and hyperkalemia (K 6.4). CXR showing b/l pleural effusion and vascular congestion. Pt c/o SOB and productive cough with white phlegm, denies fever, chest pain or palpitations. Pt found hypoxemic with SpO2 86% on room air and was placed on nasal cannula. EKG and telemetry showing A-fib. Troponin negative, pro-BNP 17,033. S/P Rocephin and Azithromycin in the ERand also started on lasix.  Nephrology consulted for Acute kidney injury.  Pt seen and examined in ED; states she feels well. Denies any acute complaint.    PAST HISTORY  --------------------------------------------------------------------------------  PAST MEDICAL & SURGICAL HISTORY:  Hypothyroidism  Depression  Dementia  Hyperlipidemia  Atrial fibrillation  No significant past surgical history    FAMILY HISTORY:  No pertinent family history in first degree relatives    PAST SOCIAL HISTORY: [facility] resident    ALLERGIES & MEDICATIONS  --------------------------------------------------------------------------------  Allergies    No Known Allergies    Intolerances      Standing Inpatient Medications  apixaban 2.5 milliGRAM(s) Oral two times a day  aspirin enteric coated 81 milliGRAM(s) Oral daily  furosemide   Injectable 40 milliGRAM(s) IV Push daily  levothyroxine 100 MICROGram(s) Oral daily  metoprolol succinate  milliGRAM(s) Oral daily  mirtazapine 30 milliGRAM(s) Oral at bedtime  simvastatin 40 milliGRAM(s) Oral at bedtime  sodium zirconium cyclosilicate 10 Gram(s) Oral every 8 hours    PRN Inpatient Medications  acetaminophen   Tablet .. 650 milliGRAM(s) Oral every 6 hours PRN  temazepam 7.5 milliGRAM(s) Oral at bedtime PRN      REVIEW OF SYSTEMS  --------------------------------------------------------------------------------  Gen: No weight changes, fatigue, fevers/chills, weakness  Skin: No rashes  Head/Eyes/Ears/Mouth: No headache; Normal hearing; Normal vision w/o blurriness; No sinus pain/discomfort, sore throat  Respiratory: No dyspnea, cough, wheezing, hemoptysis  CV: No chest pain, PND, orthopnea  GI: No abdominal pain, diarrhea, constipation, nausea, vomiting, melena, hematochezia  : No increased frequency, dysuria, hematuria, nocturia  MSK: No joint pain/swelling; no back pain; no edema  Neuro: No dizziness/lightheadedness, weakness, seizures, numbness, tingling  Heme: No easy bruising or bleeding  Endo: No heat/cold intolerance  Psych: No significant nervousness, anxiety, stress, depression    All other systems were reviewed and are negative, except as noted.    VITALS/PHYSICAL EXAM  --------------------------------------------------------------------------------  T(C): 36.6 (06-02-20 @ 07:45), Max: 37.2 (06-01-20 @ 20:52)  HR: 70 (06-02-20 @ 07:45) (70 - 96)  BP: 143/54 (06-02-20 @ 07:45) (114/72 - 157/70)  RR: 23 (06-02-20 @ 07:45) (16 - 23)  SpO2: 97% (06-02-20 @ 07:45) (83% - 100%)  Wt(kg): --  Height (cm): 160.02 (06-01-20 @ 20:52)  Weight (kg): 65.8 (06-01-20 @ 20:52)  BMI (kg/m2): 25.7 (06-01-20 @ 20:52)  BSA (m2): 1.69 (06-01-20 @ 20:52)      Physical Exam:  	Gen: NAD,   	HEENT: PERRL, supple neck  	Pulm: CTA B/L ant  	CV: RRR, S1S2; no rub               Abd: +BS, soft, nontender/nondistended  	: No suprapubic tenderness  	UE: Warm,  no edema; no asterixis  	LE: Warm, no edema  	Neuro: No focal deficits  	Psych: Normal affect and mood  	Skin: Warm, without rashes  ]    LABS/STUDIES  --------------------------------------------------------------------------------              9.5    5.53  >-----------<  177      [06-01-20 @ 21:28]              32.3     145  |  108  |  59.0  ----------------------------<  102      [06-02-20 @ 08:32]  5.9   |  25.0  |  2.38        Ca     8.8     [06-02-20 @ 08:32]      Mg     2.5     [06-02-20 @ 04:21]      Phos  5.6     [06-02-20 @ 04:21]    TPro  6.8  /  Alb  3.6  /  TBili  0.3  /  DBili  x   /  AST  25  /  ALT  22  /  AlkPhos  84  [06-01-20 @ 21:28]    PT/INR: PT 17.6 , INR 1.54       [06-01-20 @ 21:28]  PTT: 29.6       [06-01-20 @ 21:28]    Troponin 0.04      [06-01-20 @ 21:28]    Creatinine Trend:  SCr 2.38 [06-02 @ 08:32]  SCr 2.32 [06-02 @ 04:21]  SCr 2.32 [06-01 @ 21:28]    Urinalysis - [06-02-20 @ 04:08]      Color Yellow / Appearance Clear / SG 1.010 / pH 5.0      Gluc Negative / Ketone Negative  / Bili Negative / Urobili Negative       Blood Moderate / Protein 15 / Leuk Est Small / Nitrite Negative      RBC Negative / WBC 3-5 / Hyaline  / Gran  / Sq Epi  / Non Sq Epi Few / Bacteria Occasional      Ferritin 51      [06-01-20 @ 21:28]

## 2020-06-02 NOTE — CONSULT NOTE ADULT - SUBJECTIVE AND OBJECTIVE BOX
History obtained by: Patient and medical record     obtained: No    Chief complaint:  "I was having trouble breathing"    HPI:  This is 89 y/o female with hx of mild dementia, a-fib on Eliquis, HTN, HLD, former smoker who presents from assisted living facility with SOB and abnormal CXR. Pt was found to have elevated creatinine and hyperkalemia (K 6.4). CXR showing b/l pleural effusion and vascular congestion. Pt c/o SOB and productive cough with white phlegm, denies fever, chest pain or palpitations. Pt found hypoxemic with SpO2 86% on room air and was placed on nasal cannula. EKG and telemetry showing A-fib. Troponin negative, pro-BNP 17,033. Pt is not sure is she has a cardiologist, states that her nephew takes her to doctors' appointments and keeps track of her medications.      REVIEW OF SYMPTOMS:   Cardiovascular:  as per HPI  Respiratory:   c/o dyspnea and cough,     Genitourinary:  No dysuria, no hematuria;   Gastrointestinal:   No dark color stool, no melena, no diarrhea, no constipation, no abdominal pain;  Neurological: No headache, no dizziness, no slurred speech;    Psychiatric: No agitation, no anxiety.  ALL OTHER REVIEW OF SYSTEMS ARE NEGATIVE.    MEDICATIONS  (STANDING):  apixaban 2.5 milliGRAM(s) Oral two times a day  aspirin enteric coated 81 milliGRAM(s) Oral daily  furosemide   Injectable 40 milliGRAM(s) IV Push daily  levothyroxine 100 MICROGram(s) Oral daily  metoprolol succinate  milliGRAM(s) Oral daily  mirtazapine 30 milliGRAM(s) Oral at bedtime  simvastatin 40 milliGRAM(s) Oral at bedtime    MEDICATIONS  (PRN):  acetaminophen   Tablet .. 650 milliGRAM(s) Oral every 6 hours PRN Temp greater or equal to 38C (100.4F), Mild Pain (1 - 3)  temazepam 7.5 milliGRAM(s) Oral at bedtime PRN Insomnia        PAST MEDICAL & SURGICAL HISTORY:  Hypothyroidism  Depression  Dementia  Hyperlipidemia  Atrial fibrillation  No significant past surgical history      FAMILY HISTORY:  No pertinent family history in first degree relatives      SOCIAL HISTORY: lives in [facility] assisted living    CIGARETTES: former smoker, quit 15 years ago    ALCOHOL: denies    DRUGS: denies    Vital Signs Last 24 Hrs  T(C): 36.5 (2020 03:44), Max: 37.2 (2020 20:52)  T(F): 97.7 (2020 03:44), Max: 99 (2020 20:52)  HR: 88 (2020 03:44) (76 - 92)  BP: 157/70 (2020 03:44) (119/52 - 157/70)  BP(mean): --  RR: 23 (2020 03:44) (16 - 23)  SpO2: 96% (2020 03:44) (83% - 100%)    PHYSICAL EXAM:  General: WN/WD NAD  Neurology: A&Ox3, nonfocal, LARA x 4  Eyes: PERRL/ EOMI, Gross vision intact  ENT/Neck: Neck supple, trachea midline, No JVD, Gross hearing intact  Respiratory: b/l basilar fine crackles  CV: irregular, S1S2, no murmurs  Abdominal: Soft, NT, ND +BS,   Extremities: b/l LE +2 pitting edema, + peripheral pulses  Skin: No Rashes, Hematoma, Ecchymosis        INTERPRETATION OF TELEMETRY: a-fib, rate controlled    ECG: A-fib, low voltage, TWI in v1-v2, III        I&O's Detail      LABS:                        9.5    5.53  )-----------( 177      ( 2020 21:28 )             32.3     06-01    143  |  108<H>  |  54.0<H>  ----------------------------<  99  6.4<HH>   |  26.0  |  2.32<H>    Ca    8.8      2020 21:28  Phos  5.1     06-    TPro  6.8  /  Alb  3.6  /  TBili  0.3<L>  /  DBili  x   /  AST  25  /  ALT  22  /  AlkPhos  84  06-01    CARDIAC MARKERS ( :28 )  x     / 0.04 ng/mL / x     / x     / x          PT/INR - ( 2020 21:28 )   PT: 17.6 sec;   INR: 1.54 ratio         PTT - ( 2020 21:28 )  PTT:29.6 sec  Urinalysis Basic - ( 2020 04:08 )    Color: Yellow / Appearance: Clear / S.010 / pH: x  Gluc: x / Ketone: Negative  / Bili: Negative / Urobili: Negative mg/dL   Blood: x / Protein: 15 mg/dL / Nitrite: Negative   Leuk Esterase: Small / RBC: Negative /HPF / WBC 3-5   Sq Epi: x / Non Sq Epi: Few / Bacteria: Occasional      I&O's Summary      RADIOLOGY & ADDITIONAL STUDIES:  X-ray:    PREVIOUS DIAGNOSTIC TESTING:      ECHO: n/a    STRESS: n/a      CATHETERIZATION: n/a History obtained by: Patient and medical record     obtained: No    Chief complaint:  "I was having trouble breathing"    HPI:  This is 89 y/o female with hx of mild dementia, a-fib on Eliquis, HTN, HLD, former smoker who presents from assisted living facility with SOB and abnormal CXR. Pt was found to have elevated creatinine and hyperkalemia (K 6.4). CXR showing b/l pleural effusion and vascular congestion. Pt c/o SOB and productive cough with white phlegm, denies fever, chest pain or palpitations. Pt found hypoxemic with SpO2 86% on room air and was placed on nasal cannula. EKG and telemetry showing A-fib. Troponin negative, pro-BNP 17,033. Pt is not sure is she has a cardiologist, states that her nephew takes her to doctors' appointments and keeps track of her medications.      REVIEW OF SYMPTOMS:   Cardiovascular:  as per HPI  Respiratory:   c/o dyspnea and cough,     Genitourinary:  No dysuria, no hematuria;   Gastrointestinal:   No dark color stool, no melena, no diarrhea, no constipation, no abdominal pain;  Neurological: No headache, no dizziness, no slurred speech;    Psychiatric: No agitation, no anxiety.  ALL OTHER REVIEW OF SYSTEMS ARE NEGATIVE.    MEDICATIONS  (STANDING):  apixaban 2.5 milliGRAM(s) Oral two times a day  aspirin enteric coated 81 milliGRAM(s) Oral daily  furosemide   Injectable 40 milliGRAM(s) IV Push daily  levothyroxine 100 MICROGram(s) Oral daily  metoprolol succinate  milliGRAM(s) Oral daily  mirtazapine 30 milliGRAM(s) Oral at bedtime  simvastatin 40 milliGRAM(s) Oral at bedtime    MEDICATIONS  (PRN):  acetaminophen   Tablet .. 650 milliGRAM(s) Oral every 6 hours PRN Temp greater or equal to 38C (100.4F), Mild Pain (1 - 3)  temazepam 7.5 milliGRAM(s) Oral at bedtime PRN Insomnia        PAST MEDICAL & SURGICAL HISTORY:  Hypothyroidism  Depression  Dementia  Hyperlipidemia  Atrial fibrillation  No significant past surgical history      FAMILY HISTORY:  No pertinent family history in first degree relatives      SOCIAL HISTORY: lives in [facility] assisted living    CIGARETTES: former smoker, quit 15 years ago    ALCOHOL: denies    DRUGS: denies    Vital Signs Last 24 Hrs  T(C): 36.5 (2020 03:44), Max: 37.2 (2020 20:52)  T(F): 97.7 (2020 03:44), Max: 99 (2020 20:52)  HR: 88 (2020 03:44) (76 - 92)  BP: 157/70 (2020 03:44) (119/52 - 157/70)  BP(mean): --  RR: 23 (2020 03:44) (16 - 23)  SpO2: 96% (2020 03:44) (83% - 100%)    PHYSICAL EXAM:  General: WN/WD NAD  Neurology: A&Ox3, nonfocal, LARA x 4  Eyes: PERRL/ EOMI, Gross vision intact  ENT/Neck: Neck supple, trachea midline, No JVD, Gross hearing intact  Respiratory: b/l basilar fine crackles  CV: irregular, S1S2, no murmurs  Abdominal: Soft, NT, ND +BS,   Extremities: b/l LE +2 pitting edema, + peripheral pulses  Skin: No Rashes, Hematoma, Ecchymosis    INTERPRETATION OF TELEMETRY: a-fib, rate controlled    ECG: A-fib, low voltage, TWI in v1-v2, III  I&O's Detail      LABS:                        9.5    5.53  )-----------( 177      ( 2020 21:28 )             32.3     06-01    143  |  108<H>  |  54.0<H>  ----------------------------<  99  6.4<HH>   |  26.0  |  2.32<H>    Ca    8.8      2020 21:28  Phos  5.1     06-    TPro  6.8  /  Alb  3.6  /  TBili  0.3<L>  /  DBili  x   /  AST  25  /  ALT  22  /  AlkPhos  84  06-01    CARDIAC MARKERS ( :28 )  x     / 0.04 ng/mL / x     / x     / x          PT/INR - ( 2020 21:28 )   PT: 17.6 sec;   INR: 1.54 ratio         PTT - ( 2020 21:28 )  PTT:29.6 sec  Urinalysis Basic - ( 2020 04:08 )    Color: Yellow / Appearance: Clear / S.010 / pH: x  Gluc: x / Ketone: Negative  / Bili: Negative / Urobili: Negative mg/dL   Blood: x / Protein: 15 mg/dL / Nitrite: Negative   Leuk Esterase: Small / RBC: Negative /HPF / WBC 3-5   Sq Epi: x / Non Sq Epi: Few / Bacteria: Occasional      I&O's Summary      RADIOLOGY & ADDITIONAL STUDIES:  X-ray:    PREVIOUS DIAGNOSTIC TESTING:      ECHO: n/a    STRESS: n/a      CATHETERIZATION: n/a

## 2020-06-02 NOTE — CONSULT NOTE ADULT - ASSESSMENT
1) Acute kidney injury on ckd vs CKD  2) Hyperkalemia; s/p insulin, D50, bicarb and lasix  3) CHFrEF    Unknown baseline kidney function  Renal US with atrophic right kidney; b/l cortical thickening and increased echogeniticy; no hydronephrosis  Scr remains elevated/ stable  Start Lokelma 10 q8h  Repeat BMP this afternoon  Monitor SCr, lytes, UOP

## 2020-06-02 NOTE — PHYSICAL THERAPY INITIAL EVALUATION ADULT - ADDITIONAL COMMENTS
Pt lives at [facility]. Pt's PLOF was independent in all ADL's + ambulation with rollator. Pt has rollator, SAC and shower chair DME at home. At this time, no additional Assistive Device/DME required/recommended upon d/c

## 2020-06-02 NOTE — PHYSICAL THERAPY INITIAL EVALUATION ADULT - PHYSICAL ASSIST/NONPHYSICAL ASSIST: SIT/STAND, REHAB EVAL
1 person assist/pt required increased physical assistance to help perform transfer/to rise to a full standing position. verbal cues re proper sequence + proper hand placement in prep to perform transfer/verbal cues

## 2020-06-02 NOTE — PROGRESS NOTE ADULT - ASSESSMENT
Patient is a 89 year old female with PMH of Dementia, Afib on Eliquis, HLD , Hypothyroidism and Depression who was BIBA from [facility] after CXR was concerning for PNA . In the ER pt found to be in hypoxic respiratory failure most likely 2/2 to CHF, hyperkalemia and elevated Cr and BUN.      1-Acute Hypoxic Respiratory failure 2/2 to Acute on Chronic Diastolic CHF  CXR w/ cardiomegaly , congestion and bilateral small pleural effusion   BNP>91255  s/p rocephin/azit in the ER   low suspicion for PNA ( wbc and procal wnl and  no fever )  c/w lasix 40 mg QD  strict I/Os, daily weights  trop nega x 1   TTE reviewed; preserved EF, moderate TR, moderate AS, mild pulm HTN  cardio recommendations appreciated    2-Hyperkalemia   EKG - without peaked T waves  repeat K 5.9; s/p IV insulin , D50% , calcium and lasix  repeat BMP this evening with normal serum K+  continue lasix and lokelma  low K + diet  renal consult appreciated    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  unknown baseline; but family reports patient was seeing a nephrologist as outpatient  renal US - no hydro  UA reviewed  continue lasix and monitor degree of azotemia  repeat BMP in AM  avoid nephrotoxic agents  renally dose all medications  renal recommendations appreciated    4-Anemia; likely anemia of chronic kidney disease  Hb= 9.5 (goal Hb 10-11)  check iron studies  epo per renal    5-Afib   c/w eliquis 2.5 BID  c/w metoprolol succinate     6- Depression   c/w mirtazapine at bed time  PRN temazepam     7-Hypothyroidism   c/w levothyroxine     DVT ppx - Eliquis     Dispo - PT eval- return to [facility] when medically stable. Palliative care consult appreciated - full code.

## 2020-06-02 NOTE — PHYSICAL THERAPY INITIAL EVALUATION ADULT - HEALTH SCREEN CRITERIA
Progress Notes by Katherine Adorno MD at 01/06/17 01:23 PM     Author:  Katherine Adorno MD Service:  (none) Author Type:  Physician     Filed:  01/06/17 01:27 PM Encounter Date:  1/6/2017 Status:  Signed     :  Katherine Adorno MD (Physician)            Bedside u/s - complete breech presentation.  Head left.[AG1.1M]  DAVIN 21cm on u/s yesterday.[AG1.2M]  No vb, no lof  ++FMs  Discussed presentation in detail.  Reviewed options: ECV, expectant management with planned c/s if breech at term.    Risks of ECV discussed: delivery, AROM, abruption, fetal injury, emergency c/s, failure, success with subsequent breech again.  50-60% success and no epidural also reviewed.  Pt will d/w , leaning towards doing ECV.  Scheduled for 1130am on Monday tentatively.[AG1.1M]        Revision History        User Key Date/Time User Provider Type Action    > AG1.2 01/06/17 01:27 PM Katherine Adorno MD Physician Sign     AG1.1 01/06/17 01:23 PM Katherine Adorno MD Physician     M - Manual            
yes

## 2020-06-02 NOTE — CONSULT NOTE ADULT - SUBJECTIVE AND OBJECTIVE BOX
PALLIATIVE CONSULT    CC: Patient is a 89y old  Female who presents with a chief complaint of acute hypoxemic respiratory failure due to decompensated CHF, hyperkalemia (2020 09:48)    HPI:  Mrs. Rick is a 89 year old female with dementia, chronic Afib on eliquis, HLD, hypothyroidism, depression, resident of [facility] sent to [facility] after outpatient CXR concerning for PNA. In ER, found to be hypoxic with sats 80s, improved after being placed on O2NC. Labs significant for MONA, hyperkalemia, elevated BNP (17k). CXR with pulmonary congestion, b/l pleural effusion, bibasilar opacities concerning for atelectasis vs. PNA. Admitted for acute respiratory failure with hypoxia 2/2 acute decompensated CHF. GOC initiated by primary team but pt defers to her nephew. Palliative consult for advance directives and goc.     Review of Systems: As per HPI.  All others negative  + chronic BLE edema  + ambulates with cane/walker at baseline    PERTINENT PMH REVIEWED: Yes     PAST MEDICAL & SURGICAL HISTORY:  Hypothyroidism  Depression  Dementia  Hyperlipidemia  Atrial fibrillation  No significant past surgical history      SOCIAL HISTORY:    Admitted from:  [facility]  -   - children: none    Baseline ADLs (prior to admission):  moderate independent   Karnofsky: 50 %    Surrogate/HCP/Guardian:   - per chart, next of kins are Nephlisa Angela Kendall ([phone]), Ashwinelvira Buenrostro ([phone])     ADVANCE DIRECTIVES: FULL CODE    FAMILY HISTORY:  No pertinent family history in first degree relatives       Allergies    No Known Allergies    Intolerances    MEDICATIONS  (STANDING):  apixaban 2.5 milliGRAM(s) Oral two times a day  aspirin enteric coated 81 milliGRAM(s) Oral daily  furosemide   Injectable 40 milliGRAM(s) IV Push daily  levothyroxine 100 MICROGram(s) Oral daily  metoprolol succinate  milliGRAM(s) Oral daily  mirtazapine 30 milliGRAM(s) Oral at bedtime  simvastatin 40 milliGRAM(s) Oral at bedtime  sodium zirconium cyclosilicate 10 Gram(s) Oral every 8 hours    MEDICATIONS  (PRN):  acetaminophen   Tablet .. 650 milliGRAM(s) Oral every 6 hours PRN Temp greater or equal to 38C (100.4F), Mild Pain (1 - 3)  temazepam 7.5 milliGRAM(s) Oral at bedtime PRN Insomnia    PHYSICAL EXAM:  - see below    Vital Signs Last 24 Hrs  T(C): 36.6 (2020 11:38), Max: 37.2 (2020 20:52)  T(F): 97.9 (2020 11:38), Max: 99 (2020 20:52)  HR: 93 (2020 11:38) (70 - 96)  BP: 116/66 (2020 11:38) (114/72 - 157/70)  BP(mean): --  RR: 20 (2020 11:38) (16 - 23)  SpO2: 100% (2020 11:38) (83% - 100%)    LABS:                        9.5    5.53  )-----------( 177      ( 2020 21:28 )             32.3     06-02    145  |  108<H>  |  59.0<H>  ----------------------------<  102<H>  5.9<H>   |  25.0  |  2.38<H>    Ca    8.8      2020 08:32  Phos  5.6     06-02  Mg     2.5     06-02    TPro  6.8  /  Alb  3.6  /  TBili  0.3<L>  /  DBili  x   /  AST  25  /  ALT  22  /  AlkPhos  84  06-01    PT/INR - ( 2020 21:28 )   PT: 17.6 sec;   INR: 1.54 ratio         PTT - ( 2020 21:28 )  PTT:29.6 sec  Urinalysis Basic - ( 2020 04:08 )    Color: Yellow / Appearance: Clear / S.010 / pH: x  Gluc: x / Ketone: Negative  / Bili: Negative / Urobili: Negative mg/dL   Blood: x / Protein: 15 mg/dL / Nitrite: Negative   Leuk Esterase: Small / RBC: Negative /HPF / WBC 3-5   Sq Epi: x / Non Sq Epi: Few / Bacteria: Occasional    RADIOLOGY & ADDITIONAL STUDIES:      CXR 2020  IMPRESSION:  Suspect pulmonary edema. Bilateral pleural effusion. Bibasilar opacities, which may represent atelectasis although pneumonia cannot be excluded. Recommend clinical correlation. Follow-up may be obtained for further evaluation as indicated.     Renal US 2020  FINDINGS: This study was technically difficult due to patient's inability to cooperate.    Right kidney: Atrophic, measuring 6.8 cm in length. Increased echogenicity and cortical thinning, reflecting parenchymal disease. No hydronephrosis or obvious renal stone. Possible small cortical cyst, which is difficult to visualize.    Left kidney: Measures 9.7 cm in length. No hydronephrosis or obvious renal stone. Increased echogenicity and slight cortical thickening, reflecting parenchymal disease. A 1.2 x 0.9 x 1.2 cm cyst in the upper pole.    Additional: Bilateral pleural effusion.    IMPRESSION:    Technically difficult study. No hydronephrosis or obvious renal stone. Additional findings as described.    Thank you for the opportunity to assist with the care of this patient.   [facility] [phone].

## 2020-06-02 NOTE — PHYSICAL THERAPY INITIAL EVALUATION ADULT - PHYSICAL ASSIST/NONPHYSICAL ASSIST: SIT/SUPINE, REHAB EVAL
verbal cues/pt required increased physical assistance to help get bilateral LE's into bed and to help assume proper semi-lehman position in bed; verbal cues for proper sequence + proper use of bedrails; pt required increased physical assistance due increased height of hospital stretcher + pt decreased height; nursing assistant present at bedside to help provide assistance/2 person assist

## 2020-06-02 NOTE — CONSULT NOTE ADULT - ASSESSMENT
This is 89 y/o female with hx of mild dementia, a-fib on Eliquis, HTN, HLD, former smoker who presents from assisted living facility with SOB and abnormal CXR. Pt was found to have elevated creatinine and hyperkalemia (K 6.4). CXR showing b/l pleural effusion and vascular congestion. Pt c/o SOB and productive cough with white phlegm, denies fever, chest pain or palpitations. Pt found hypoxemic with SpO2 86% on room air and was placed on nasal cannula. EKG and telemetry showing A-fib. Troponin negative, pro-BNP 17,033. Pt is not sure is she has a cardiologist, states that her nephew takes her to doctors' appointments and keeps track of her medications.

## 2020-06-02 NOTE — PHYSICAL THERAPY INITIAL EVALUATION ADULT - GENERAL OBSERVATIONS, REHAB EVAL
Pt received in ERHR, Zone B, TODD radford'ed pt for PT. pt observed semi-lehman in bed with 6 L O2 NC, telemonitor with , pleasant, cooperative, A&O, nursing assistant present at bedside and c/o 0/10 pain

## 2020-06-02 NOTE — PHYSICAL THERAPY INITIAL EVALUATION ADULT - BED MOBILITY LIMITATIONS, REHAB EVAL
36.7
decreased ability to use arms for pushing/pulling/decreased ability to use legs for bridging/pushing

## 2020-06-02 NOTE — GOALS OF CARE CONVERSATION - ADVANCED CARE PLANNING - CONVERSATION DETAILS
Pt says she does not have any living will or advanced directives and defers decision making regarding wishes in terms of medical emergency to her nephews as she doesn't fell comfortable making decision.  PGY3 attempted to call Ashwin Buenrostro overnight but was unsuccessful.  Palliative care consult had been placed.     I received call back from Ashwin Buenrostro around 6:40AM  He states that pt is , has no children and that her health care proxy is a different nephew who lives in [place] named Julio César Argueta.  Julio César was called in for conference call and said that 'he has a bunch of documents' related to health care proxy, but was unsure of any living will and doesn't recall prior conversations about her wishes in terms of medical emergency such as respiratory or cardiac arrest.  I explained that palliative care consult for ongoing goals of care discussions which both nephews were amenable to.  Pt currently FULL CODE. Pt says she does not have any living will or advanced directives.  She kept deferring any decision making regarding wishes in terms of medical emergency/end of life issues to her nephews as she says 'she doesn't feel comfortable making those decisions'.  PGY3 attempted to call Ashwin Buenrostro overnight but was unsuccessful.  Palliative care consult had been placed.     I received call back from Ashwin Buenrostro around 6:40AM  He states that pt is , has no children and that her health care proxy is a different nephew who lives in [place] named Julio César Argueta.  Julio César was called in for conference call and said that 'he has a bunch of documents' related to health care proxy, but was unsure of any living will and doesn't recall prior conversations about her wishes in terms of medical emergency such as respiratory or cardiac arrest.  I explained that palliative care consult for ongoing goals of care discussions which both nephews were amenable to.  Pt currently FULL CODE. Pt says she does not have any living will or advanced directives.  She kept deferring any decision making regarding wishes in terms of medical emergency/end of life issues to her nephews as she says 'she doesn't feel comfortable making those decisions'.  PGY3 attempted to call Ashwin Buenrostro overnight but was unsuccessful.  Palliative care consult had been placed.     I received call back from Ashwin Buenrostro around 6:40AM  He states that pt is , has no children and that her health care proxy is a different nephew who lives in [place] named Julio César Argueta.  Julio César was called in for conference call and said that 'he has a bunch of documents' related to health care proxy, but was unsure of any living will and doesn't recall prior conversations about her wishes in terms of medical emergency such as respiratory or cardiac arrest.  I explained that palliative care consulted for ongoing goals of care discussions which both nephews were amenable to.  Pt currently FULL CODE while awaiting palliative eval.

## 2020-06-02 NOTE — PHYSICAL THERAPY INITIAL EVALUATION ADULT - CRITERIA FOR SKILLED THERAPEUTIC INTERVENTIONS
anticipated discharge recommendation/impairments found/risk reduction/prevention/functional limitations in following categories/therapy frequency/predicted duration of therapy intervention/anticipated equipment needs at discharge/rehab potential

## 2020-06-02 NOTE — CONSULT NOTE ADULT - ASSESSMENT
89F with dementia, Afib on AC, depression from [facility] admitted for acute respiratory failure with hypoxia 2/2 acute decompensated CHF, pleural effusions, MONA, hyperkalemia. Palliative consult to assist with directives and goc.     PLAN    Acute Respiratory Failure/Dyspnea  - etiology more likely CHF in nature rather than PNA as pt is not septic, afebrile, no leukocytosis or elevation of procalcitonin  - s/p empiric antibiotic in ER  - hemodynamically stable, maintaining adequate sats on O2NC    Acute Decompensated CHF  - CXR with congestion/effusion, elevated BNP   - cardio following, input appreciate  - per chart, OP cardiologist: Dr. Chase in [facility]  - on IV lasix, pending TTE    Dementia  - likely mild/moderate, resident of [facility] assisted living   - will follow up with family to obtain additional collateral info    Palliative Care Encounter  Per chart review, pt defers to her nephew for her medical history and decision making.  Plan to follow up with her nephews and awaiting call back from hospitalist to further discuss case. 89F with dementia, Afib on AC, depression from [facility] admitted for acute respiratory failure with hypoxia 2/2 acute decompensated CHF, pleural effusions, MONA, hyperkalemia. Palliative consult to assist with directives and goc.     PLAN    Acute Respiratory Failure/Dyspnea  - etiology more likely CHF in nature rather than PNA as pt is not septic, afebrile, no leukocytosis or elevation of procalcitonin  - s/p empiric antibiotic in ER  - hemodynamically stable, maintaining adequate sats on O2NC    Acute Decompensated CHF  - CXR with congestion/effusion, elevated BNP   - cardio following, input appreciate  - per chart, OP cardiologist: Dr. Chase in [facility]  - on IV lasix, pending TTE    Dementia  - likely mild/moderate, resident of [facility] assisted living   - will follow up with family to obtain additional collateral info    Palliative Care Encounter  Per chart review, pt defers to her nephews for her medical history and decision making.  Plan to follow up with her nephews for additional collateral information, advance directives and goc. 89F with dementia, Afib on AC, depression from [facility] admitted for acute respiratory failure with hypoxia 2/2 acute decompensated CHF, pleural effusions, MONA, hyperkalemia. Palliative consult to assist with directives and goc.     PLAN    Acute Respiratory Failure/Dyspnea  - etiology more likely CHF in nature rather than PNA as pt is not septic, afebrile, no leukocytosis or elevation of procalcitonin  - s/p empiric antibiotic in ER  - pending results of COVID19 testing   - hemodynamically stable, maintaining adequate sats on O2NC    Acute Decompensated CHF  - CXR with congestion/effusion, elevated BNP   - cardio following, input appreciate  - per chart, OP cardiologist: Dr. Chase in [facility]  - on IV lasix, pending TTE    Dementia  - likely mild/moderate, resident of [facility] assisted living   - will follow up with family to obtain additional collateral info    Palliative Care Encounter  Per chart review, pt defers to her nephews for her medical history and decision making.  Plan to follow up with her nephews for additional collateral information, advance directives and goc.

## 2020-06-03 LAB
ANION GAP SERPL CALC-SCNC: 13 MMOL/L — SIGNIFICANT CHANGE UP (ref 5–17)
BASOPHILS # BLD AUTO: 0.01 K/UL — SIGNIFICANT CHANGE UP (ref 0–0.2)
BASOPHILS NFR BLD AUTO: 0.2 % — SIGNIFICANT CHANGE UP (ref 0–2)
BUN SERPL-MCNC: 51 MG/DL — HIGH (ref 8–20)
CALCIUM SERPL-MCNC: 8.4 MG/DL — LOW (ref 8.6–10.2)
CHLORIDE SERPL-SCNC: 104 MMOL/L — SIGNIFICANT CHANGE UP (ref 98–107)
CO2 SERPL-SCNC: 24 MMOL/L — SIGNIFICANT CHANGE UP (ref 22–29)
CREAT SERPL-MCNC: 2.14 MG/DL — HIGH (ref 0.5–1.3)
EOSINOPHIL # BLD AUTO: 0.14 K/UL — SIGNIFICANT CHANGE UP (ref 0–0.5)
EOSINOPHIL NFR BLD AUTO: 2.3 % — SIGNIFICANT CHANGE UP (ref 0–6)
FERRITIN SERPL-MCNC: 51 NG/ML — SIGNIFICANT CHANGE UP (ref 15–150)
GLUCOSE SERPL-MCNC: 101 MG/DL — HIGH (ref 70–99)
HCT VFR BLD CALC: 31 % — LOW (ref 34.5–45)
HGB BLD-MCNC: 8.7 G/DL — LOW (ref 11.5–15.5)
IMM GRANULOCYTES NFR BLD AUTO: 0.5 % — SIGNIFICANT CHANGE UP (ref 0–1.5)
IRON SATN MFR SERPL: 27 UG/DL — LOW (ref 37–145)
IRON SATN MFR SERPL: 9 % — LOW (ref 14–50)
LYMPHOCYTES # BLD AUTO: 0.86 K/UL — LOW (ref 1–3.3)
LYMPHOCYTES # BLD AUTO: 13.8 % — SIGNIFICANT CHANGE UP (ref 13–44)
MAGNESIUM SERPL-MCNC: 2.2 MG/DL — SIGNIFICANT CHANGE UP (ref 1.6–2.6)
MCHC RBC-ENTMCNC: 28.1 GM/DL — LOW (ref 32–36)
MCHC RBC-ENTMCNC: 29.4 PG — SIGNIFICANT CHANGE UP (ref 27–34)
MCV RBC AUTO: 104.7 FL — HIGH (ref 80–100)
MONOCYTES # BLD AUTO: 0.78 K/UL — SIGNIFICANT CHANGE UP (ref 0–0.9)
MONOCYTES NFR BLD AUTO: 12.5 % — SIGNIFICANT CHANGE UP (ref 2–14)
NEUTROPHILS # BLD AUTO: 4.4 K/UL — SIGNIFICANT CHANGE UP (ref 1.8–7.4)
NEUTROPHILS NFR BLD AUTO: 70.7 % — SIGNIFICANT CHANGE UP (ref 43–77)
PHOSPHATE SERPL-MCNC: 5.7 MG/DL — HIGH (ref 2.4–4.7)
PLATELET # BLD AUTO: 127 K/UL — LOW (ref 150–400)
POTASSIUM SERPL-MCNC: 5.1 MMOL/L — SIGNIFICANT CHANGE UP (ref 3.5–5.3)
POTASSIUM SERPL-SCNC: 5.1 MMOL/L — SIGNIFICANT CHANGE UP (ref 3.5–5.3)
PROCALCITONIN SERPL-MCNC: 0.1 NG/ML — SIGNIFICANT CHANGE UP (ref 0.02–0.1)
RBC # BLD: 2.96 M/UL — LOW (ref 3.8–5.2)
RBC # FLD: 17.1 % — HIGH (ref 10.3–14.5)
SODIUM SERPL-SCNC: 141 MMOL/L — SIGNIFICANT CHANGE UP (ref 135–145)
TIBC SERPL-MCNC: 307 UG/DL — SIGNIFICANT CHANGE UP (ref 220–430)
TRANSFERRIN SERPL-MCNC: 215 MG/DL — SIGNIFICANT CHANGE UP (ref 192–382)
WBC # BLD: 6.22 K/UL — SIGNIFICANT CHANGE UP (ref 3.8–10.5)
WBC # FLD AUTO: 6.22 K/UL — SIGNIFICANT CHANGE UP (ref 3.8–10.5)

## 2020-06-03 PROCEDURE — 99233 SBSQ HOSP IP/OBS HIGH 50: CPT

## 2020-06-03 PROCEDURE — 99232 SBSQ HOSP IP/OBS MODERATE 35: CPT

## 2020-06-03 PROCEDURE — 71045 X-RAY EXAM CHEST 1 VIEW: CPT | Mod: 26,CS

## 2020-06-03 RX ORDER — IRON SUCROSE 20 MG/ML
200 INJECTION, SOLUTION INTRAVENOUS EVERY 24 HOURS
Refills: 0 | Status: DISCONTINUED | OUTPATIENT
Start: 2020-06-03 | End: 2020-06-05

## 2020-06-03 RX ADMIN — Medication 81 MILLIGRAM(S): at 11:51

## 2020-06-03 RX ADMIN — IRON SUCROSE 110 MILLIGRAM(S): 20 INJECTION, SOLUTION INTRAVENOUS at 11:51

## 2020-06-03 RX ADMIN — APIXABAN 2.5 MILLIGRAM(S): 2.5 TABLET, FILM COATED ORAL at 05:10

## 2020-06-03 RX ADMIN — SODIUM ZIRCONIUM CYCLOSILICATE 10 GRAM(S): 10 POWDER, FOR SUSPENSION ORAL at 11:51

## 2020-06-03 RX ADMIN — MIRTAZAPINE 30 MILLIGRAM(S): 45 TABLET, ORALLY DISINTEGRATING ORAL at 21:10

## 2020-06-03 RX ADMIN — Medication 100 MILLIGRAM(S): at 05:10

## 2020-06-03 RX ADMIN — SODIUM ZIRCONIUM CYCLOSILICATE 10 GRAM(S): 10 POWDER, FOR SUSPENSION ORAL at 05:10

## 2020-06-03 RX ADMIN — SODIUM ZIRCONIUM CYCLOSILICATE 10 GRAM(S): 10 POWDER, FOR SUSPENSION ORAL at 21:10

## 2020-06-03 RX ADMIN — Medication 100 MICROGRAM(S): at 05:10

## 2020-06-03 RX ADMIN — Medication 40 MILLIGRAM(S): at 05:10

## 2020-06-03 RX ADMIN — APIXABAN 2.5 MILLIGRAM(S): 2.5 TABLET, FILM COATED ORAL at 17:31

## 2020-06-03 RX ADMIN — SIMVASTATIN 40 MILLIGRAM(S): 20 TABLET, FILM COATED ORAL at 21:10

## 2020-06-03 RX ADMIN — Medication 600 MILLIGRAM(S): at 12:47

## 2020-06-03 NOTE — PROGRESS NOTE ADULT - ASSESSMENT
89F with dementia, Afib on AC, depression from [facility] admitted for acute respiratory failure with hypoxia 2/2 acute decompensated CHF, pleural effusions, MONA, hyperkalemia.     PLAN    Acute Respiratory Failure/Dyspnea  - improved since admission, on O2NC and maintaining adequate sats    Acute Decompensated CHF  - CXR with congestion/effusion, elevated BNP   - s/p TTE, noted above  - per chart, OP cardiologist: Dr. Chase in [facility]  - cardio following  - c/w IV lasix    Moderate Dementia  - baseline is mostly independent of ADLs, ambulates with walker and is a resident of [facility] assisted living for past 1 month    Palliative Care Encounter  Discussed at length with pt's two nephew, Julio César (HCP) and Ashwin on 6/2 via phone. See GOC. Family wishes at this time to continue with all medical interventions and for aunt to return back to [facility] when medically stable. Family plans to further discuss directives with patient when they are able to see her in person. At this time remains full code.     Nephjuan m Angela provided copy of HCP, which was placed in chart.

## 2020-06-03 NOTE — PROGRESS NOTE ADULT - ASSESSMENT
A/P:  89 y/o female with hx of mild dementia, a-fib on Eliquis, HTN, HLD, former smoker who presents from assisted living facility with SOB and abnormal CXR. Pt was found to have elevated creatinine and hyperkalemia (K 6.4). CXR showing b/l pleural effusion and vascular congestion. Pt c/o SOB and productive cough with white phlegm, denies fever, chest pain or palpitations. Pt found hypoxemic with SpO2 86% on room air and was placed on nasal cannula. EKG and telemetry showing A-fib. Troponin negative, pro-BNP 17,033. Pt is not sure is she has a cardiologist, states that her nephew takes her to doctors' appointments and keeps track of her medications.    6/3: Patient sleeping and difficult to arouse.  Per RN Rocio, pt is presenting with normal behavior.  Stated about an hour prior she was at her baseline a&ox3 w/some confusion awake, pleasant eating breakfast with no complaints. She states that when the patient sleeps she is VERY difficult to arouse.      CHF (congestive heart failure)  -improving  -TTE: EF 60-65%, mod. TR, mild pHTN (46mmHg), mod aortic stenosis  -Monitor on telemetry    -c/w Lasix  -Strict I/O and daily standing weights (if possible)  -Keep K > 4, Mg > 2    -Monitor renal function with ongoing diuresis   -neph recs appreciated re diuresis w/MONA vs CKD      Chronic atrial fibrillation  -continue Eliquis for AC  -continue Toprol for rate control.       Hyperkalemia  -secondary to renal failure  -now K5.1  -nephrology following     Patient currently stable from cardiac standpoint and may follow up as outpatient.  Thank you for allowing me to participate in care of your patient.   Please call as needed.

## 2020-06-03 NOTE — PROGRESS NOTE ADULT - ASSESSMENT
1) Acute kidney injury on ckd- unknown stage  2) Hyperkalemia; s/p insulin, D50, bicarb and lasix; now on Lokelma  3) CHFrEF  4) Anemia of CKD with superimposed iron def      Renal US with atrophic right kidney; b/l cortical thickening and increased echogeniticy; no hydronephrosis  Scr improving with diuresis  Continue Lokelma  Continue IV iron, will give TON  Monitor SCr, lytes, UOP 1) Acute kidney injury on ckd- unknown stage  2) Hyperkalemia; s/p insulin, D50, bicarb and lasix; now on Lokelma  3) CHFrEF- TTE with preserved EF, moderate TR, moderate AS, mild pulm HTN  4) Anemia of CKD with superimposed iron def      Renal US with atrophic right kidney; b/l cortical thickening and increased echogeniticy; no hydronephrosis  Scr improving with diuresis  Continue Lokelma  Continue IV iron, will give TON  Monitor SCr, lytes, UOP

## 2020-06-03 NOTE — PROGRESS NOTE ADULT - SUBJECTIVE AND OBJECTIVE BOX
FOLLOW UP VISIT    INTERVAL HPI/OVERNIGHT EVENTS:  No acute events overnight.   Seen and examined earlier this morning, was very sleepy but arousable to name and tactile stimuli. No acute distress.     Present Symptoms:   Dyspnea:  improved since admission  Nausea/Vomiting:  No  Anxiety:   No  Depression: No  Fatigue: Yes   Loss of appetite:  No  Pain: No    Review of Systems: Reviewed     MEDICATIONS  (STANDING):  apixaban 2.5 milliGRAM(s) Oral two times a day  aspirin enteric coated 81 milliGRAM(s) Oral daily  furosemide   Injectable 40 milliGRAM(s) IV Push daily  iron sucrose IVPB 200 milliGRAM(s) IV Intermittent every 24 hours  levothyroxine 100 MICROGram(s) Oral daily  metoprolol succinate  milliGRAM(s) Oral daily  mirtazapine 30 milliGRAM(s) Oral at bedtime  simvastatin 40 milliGRAM(s) Oral at bedtime  sodium zirconium cyclosilicate 10 Gram(s) Oral every 8 hours    MEDICATIONS  (PRN):  acetaminophen   Tablet .. 650 milliGRAM(s) Oral every 6 hours PRN Temp greater or equal to 38C (100.4F), Mild Pain (1 - 3)  guaiFENesin  milliGRAM(s) Oral every 12 hours PRN Cough  temazepam 7.5 milliGRAM(s) Oral at bedtime PRN Insomnia      PHYSICAL EXAM:    Vital Signs Last 24 Hrs  T(C): 36.8 (2020 11:25), Max: 36.8 (2020 11:25)  T(F): 98.3 (2020 11:25), Max: 98.3 (2020 11:25)  HR: 75 (2020 11:25) (75 - 98)  BP: 103/64 (2020 11:25) (103/64 - 138/53)  BP(mean): --  RR: 20 (2020 11:25) (20 - 20)  SpO2: 100% (2020 11:25) (95% - 100%)    General: Resting comfortably. No acute distress   HEENT: mucous membrane moist   Lungs: comfortable. non-labored.   CV: +s1/s2. Regular rate and rhythm.    GI: +bowel sound. abdomen soft, non-tender, non-distended   MSK: Moves all 4 extremities.   Neuro: nonfocal. fatigued. responsive to name and tactile stimuli   Skin: warm and dry. no rash.     LABS:                          8.7    6.22  )-----------( 127      ( 2020 06:57 )             31.0     06-03    141  |  104  |  51.0<H>  ----------------------------<  101<H>  5.1   |  24.0  |  2.14<H>    Ca    8.4<L>      2020 06:56  Phos  5.7     06-  Mg     2.2     06-03    TPro  6.8  /  Alb  3.6  /  TBili  0.3<L>  /  DBili  x   /  AST  25  /  ALT  22  /  AlkPhos  84  06-    PT/INR - ( 2020 21:28 )   PT: 17.6 sec;   INR: 1.54 ratio         PTT - ( 2020 21:28 )  PTT:29.6 sec  Urinalysis Basic - ( 2020 04:08 )    Color: Yellow / Appearance: Clear / S.010 / pH: x  Gluc: x / Ketone: Negative  / Bili: Negative / Urobili: Negative mg/dL   Blood: x / Protein: 15 mg/dL / Nitrite: Negative   Leuk Esterase: Small / RBC: Negative /HPF / WBC 3-5   Sq Epi: x / Non Sq Epi: Few / Bacteria: Occasional    RADIOLOGY & ADDITIONAL STUDIES:     CXR 6/3/2020  Heart enlargement again noted.  Diffuse increased interstitial pattern mild in degree roughly symmetric is noted. This suggests CHF. Chest appearance has evolved somewhat from  but essentially similar in overall extent.    Extensive degeneration of the acromia left greater than right again noted.  IMPRESSION: Congestive pattern again noted.    TTE 6/3/2020  Summary:   1. Left ventricular ejection fraction, by visual estimation, is 65 to 70%.   2. Technically adequate study.   3. Normal global left ventricular systolic function.   4. Normal left ventricular internal cavity size.   5. The mitral in-flow pattern reveals no discernable A-wave, therefore no comment on diastolic function can be made.   6. There is no evidence of pericardial effusion.   7. Moderate tricuspid regurgitation.   8. Estimated pulmonary artery systolic pressure is 46.0 mmHg assuming a right atrial pressure of 3 mmHg, which is consistent with mild pulmonary hypertension.   9. Peak transaortic gradient equals 11.7 mmHg, mean transaortic gradient equals 6.5 mmHg, the calculated aortic valve area equals 1.11 cm² by the continuity equation consistent with moderate aortic stenosis.    ADVANCE DIRECTIVES: FULL CODE

## 2020-06-03 NOTE — PROGRESS NOTE ADULT - SUBJECTIVE AND OBJECTIVE BOX
[facility] DIVISION OF KIDNEY DISEASES AND HYPERTENSION -- FOLLOW UP NOTE  --------------------------------------------------------------------------------  Chief Complaint:    24 hour events/subjective:        PAST HISTORY  --------------------------------------------------------------------------------  No significant changes to PMH, PSH, FHx, SHx, unless otherwise noted    ALLERGIES & MEDICATIONS  --------------------------------------------------------------------------------  Allergies    No Known Allergies    Intolerances      Standing Inpatient Medications  apixaban 2.5 milliGRAM(s) Oral two times a day  aspirin enteric coated 81 milliGRAM(s) Oral daily  furosemide   Injectable 40 milliGRAM(s) IV Push daily  iron sucrose IVPB 200 milliGRAM(s) IV Intermittent every 24 hours  levothyroxine 100 MICROGram(s) Oral daily  metoprolol succinate  milliGRAM(s) Oral daily  mirtazapine 30 milliGRAM(s) Oral at bedtime  simvastatin 40 milliGRAM(s) Oral at bedtime  sodium zirconium cyclosilicate 10 Gram(s) Oral every 8 hours    PRN Inpatient Medications  acetaminophen   Tablet .. 650 milliGRAM(s) Oral every 6 hours PRN  guaiFENesin  milliGRAM(s) Oral every 12 hours PRN  temazepam 7.5 milliGRAM(s) Oral at bedtime PRN      REVIEW OF SYSTEMS  --------------------------------------------------------------------------------  Gen: No weight changes, fatigue, fevers/chills, weakness  Skin: No rashes  Head/Eyes/Ears/Mouth: No headache; Normal hearing; Normal vision w/o blurriness; No sinus pain/discomfort, sore throat  Respiratory: No dyspnea, cough, wheezing, hemoptysis  CV: No chest pain, PND, orthopnea  GI: No abdominal pain, diarrhea, constipation, nausea, vomiting, melena, hematochezia  : No increased frequency, dysuria, hematuria, nocturia  MSK: No joint pain/swelling; no back pain; no edema  Neuro: No dizziness/lightheadedness, weakness, seizures, numbness, tingling  Heme: No easy bruising or bleeding  Endo: No heat/cold intolerance  Psych: No significant nervousness, anxiety, stress, depression    All other systems were reviewed and are negative, except as noted.    VITALS/PHYSICAL EXAM  --------------------------------------------------------------------------------  T(C): 36.8 (06-03-20 @ 11:25), Max: 36.8 (06-03-20 @ 11:25)  HR: 75 (06-03-20 @ 11:25) (75 - 98)  BP: 103/64 (06-03-20 @ 11:25) (103/64 - 138/53)  RR: 20 (06-03-20 @ 11:25) (20 - 20)  SpO2: 100% (06-03-20 @ 11:25) (95% - 100%)  Wt(kg): --  Height (cm): 160.02 (06-01-20 @ 20:52)  Weight (kg): 65.8 (06-01-20 @ 20:52)  BMI (kg/m2): 25.7 (06-01-20 @ 20:52)  BSA (m2): 1.69 (06-01-20 @ 20:52)      06-02-20 @ 07:01  -  06-03-20 @ 07:00  --------------------------------------------------------  IN: 360 mL / OUT: 0 mL / NET: 360 mL      Physical Exam:  	Gen: NAD, well-appearing  	HEENT: PERRL, supple neck, clear oropharynx  	Pulm: CTA B/L  	CV: RRR, S1S2; no rub  	Back: No spinal or CVA tenderness; no sacral edema  	Abd: +BS, soft, nontender/nondistended  	: No suprapubic tenderness  	UE: Warm, FROM, no clubbing, intact strength; no edema; no asterixis  	LE: Warm, FROM, no clubbing, intact strength; no edema  	Neuro: No focal deficits, intact gait  	Psych: Normal affect and mood  	Skin: Warm, without rashes  	Vascular access:    LABS/STUDIES  --------------------------------------------------------------------------------              8.7    6.22  >-----------<  127      [06-03-20 @ 06:57]              31.0     141  |  104  |  51.0  ----------------------------<  101      [06-03-20 @ 06:56]  5.1   |  24.0  |  2.14        Ca     8.4     [06-03-20 @ 06:56]      Mg     2.2     [06-03-20 @ 06:56]      Phos  5.7     [06-03-20 @ 06:56]    TPro  6.8  /  Alb  3.6  /  TBili  0.3  /  DBili  x   /  AST  25  /  ALT  22  /  AlkPhos  84  [06-01-20 @ 21:28]    PT/INR: PT 17.6 , INR 1.54       [06-01-20 @ 21:28]  PTT: 29.6       [06-01-20 @ 21:28]    Troponin 0.04      [06-01-20 @ 21:28]    Creatinine Trend:  SCr 2.14 [06-03 @ 06:56]  SCr 2.45 [06-02 @ 16:57]  SCr 2.38 [06-02 @ 08:32]  SCr 2.32 [06-02 @ 04:21]  SCr 2.32 [06-01 @ 21:28]    Urinalysis - [06-02-20 @ 04:08]      Color Yellow / Appearance Clear / SG 1.010 / pH 5.0      Gluc Negative / Ketone Negative  / Bili Negative / Urobili Negative       Blood Moderate / Protein 15 / Leuk Est Small / Nitrite Negative      RBC Negative / WBC 3-5 / Hyaline  / Gran  / Sq Epi  / Non Sq Epi Few / Bacteria Occasional      Iron 27, TIBC 307, %sat 9      [06-03-20 @ 06:56]  Ferritin 51      [06-03-20 @ 06:56] [facility] DIVISION OF KIDNEY DISEASES AND HYPERTENSION -- FOLLOW UP NOTE  --------------------------------------------------------------------------------  Chief Complaint: kandy on ckd    24 hour events/subjective:  no acute event  pt seen and examined; feels well        PAST HISTORY  --------------------------------------------------------------------------------  No significant changes to PMH, PSH, FHx, SHx, unless otherwise noted    ALLERGIES & MEDICATIONS  --------------------------------------------------------------------------------  Allergies    No Known Allergies    Intolerances      Standing Inpatient Medications  apixaban 2.5 milliGRAM(s) Oral two times a day  aspirin enteric coated 81 milliGRAM(s) Oral daily  furosemide   Injectable 40 milliGRAM(s) IV Push daily  iron sucrose IVPB 200 milliGRAM(s) IV Intermittent every 24 hours  levothyroxine 100 MICROGram(s) Oral daily  metoprolol succinate  milliGRAM(s) Oral daily  mirtazapine 30 milliGRAM(s) Oral at bedtime  simvastatin 40 milliGRAM(s) Oral at bedtime  sodium zirconium cyclosilicate 10 Gram(s) Oral every 8 hours    PRN Inpatient Medications  acetaminophen   Tablet .. 650 milliGRAM(s) Oral every 6 hours PRN  guaiFENesin  milliGRAM(s) Oral every 12 hours PRN  temazepam 7.5 milliGRAM(s) Oral at bedtime PRN      REVIEW OF SYSTEMS  --------------------------------------------------------------------------------  Gen: No weight changes, fatigue, fevers/chills, weakness  Skin: No rashes  Head/Eyes/Ears/Mouth: No headache; Normal hearing; Normal vision w/o blurriness; No sinus pain/discomfort, sore throat  Respiratory: No dyspnea, cough, wheezing, hemoptysis  CV: No chest pain, PND, orthopnea  GI: No abdominal pain, diarrhea, constipation, nausea, vomiting, melena, hematochezia  : No increased frequency, dysuria, hematuria, nocturia  MSK: No joint pain/swelling; no back pain; no edema  Neuro: No dizziness/lightheadedness, weakness, seizures, numbness, tingling  Heme: No easy bruising or bleeding  Endo: No heat/cold intolerance  Psych: No significant nervousness, anxiety, stress, depression    All other systems were reviewed and are negative, except as noted.    VITALS/PHYSICAL EXAM  --------------------------------------------------------------------------------  T(C): 36.8 (06-03-20 @ 11:25), Max: 36.8 (06-03-20 @ 11:25)  HR: 75 (06-03-20 @ 11:25) (75 - 98)  BP: 103/64 (06-03-20 @ 11:25) (103/64 - 138/53)  RR: 20 (06-03-20 @ 11:25) (20 - 20)  SpO2: 100% (06-03-20 @ 11:25) (95% - 100%)  Wt(kg): --  Height (cm): 160.02 (06-01-20 @ 20:52)  Weight (kg): 65.8 (06-01-20 @ 20:52)  BMI (kg/m2): 25.7 (06-01-20 @ 20:52)  BSA (m2): 1.69 (06-01-20 @ 20:52)      06-02-20 @ 07:01  -  06-03-20 @ 07:00  --------------------------------------------------------  IN: 360 mL / OUT: 0 mL / NET: 360 mL      Physical Exam:  	Gen: NAD,   	HEENT: PERRL, supple neck  	Pulm: CTA B/L ant  	CV: RRR, S1S2; no rub               Abd: +BS, soft, nontender/nondistended  	: No suprapubic tenderness  	UE: Warm,  no edema; no asterixis  	LE: Warm, no edema  	Neuro: No focal deficits  	Psych: Normal affect and mood  	Skin: Warm, without rashes  ]    LABS/STUDIES  --------------------------------------------------------------------------------              8.7    6.22  >-----------<  127      [06-03-20 @ 06:57]              31.0     141  |  104  |  51.0  ----------------------------<  101      [06-03-20 @ 06:56]  5.1   |  24.0  |  2.14        Ca     8.4     [06-03-20 @ 06:56]      Mg     2.2     [06-03-20 @ 06:56]      Phos  5.7     [06-03-20 @ 06:56]    TPro  6.8  /  Alb  3.6  /  TBili  0.3  /  DBili  x   /  AST  25  /  ALT  22  /  AlkPhos  84  [06-01-20 @ 21:28]    PT/INR: PT 17.6 , INR 1.54       [06-01-20 @ 21:28]  PTT: 29.6       [06-01-20 @ 21:28]    Troponin 0.04      [06-01-20 @ 21:28]    Creatinine Trend:  SCr 2.14 [06-03 @ 06:56]  SCr 2.45 [06-02 @ 16:57]  SCr 2.38 [06-02 @ 08:32]  SCr 2.32 [06-02 @ 04:21]  SCr 2.32 [06-01 @ 21:28]    Urinalysis - [06-02-20 @ 04:08]      Color Yellow / Appearance Clear / SG 1.010 / pH 5.0      Gluc Negative / Ketone Negative  / Bili Negative / Urobili Negative       Blood Moderate / Protein 15 / Leuk Est Small / Nitrite Negative      RBC Negative / WBC 3-5 / Hyaline  / Gran  / Sq Epi  / Non Sq Epi Few / Bacteria Occasional      Iron 27, TIBC 307, %sat 9      [06-03-20 @ 06:56]  Ferritin 51      [06-03-20 @ 06:56]

## 2020-06-03 NOTE — PROGRESS NOTE ADULT - SUBJECTIVE AND OBJECTIVE BOX
[place] [facility] Practice                                                               Office: [address] [address]                                                              Telephone: [phone]. Fax:[phone]                                                                             PROGRESS NOTE  Reason for follow up: CHF  Overnight: No new events.   Update: 6/3: Patient sleeping and difficult to arouse.  Per RN Rocio, pt is presenting with normal behavior.  Stated about an hour prior she was at her baseline a&ox3 w/some confusion awake, pleasant eating breakfast with no complaints. She states that when the patient sleeps she is VERY difficult to arouse.      Review of symptoms:   patient sleeping. does not wake to answer questions.  	  Vitals:  T(C): 36.8 (06-03-20 @ 11:25), Max: 36.8 (06-03-20 @ 11:25)  HR: 75 (06-03-20 @ 11:25) (75 - 98)  BP: 103/64 (06-03-20 @ 11:25) (103/64 - 138/53)  RR: 20 (06-03-20 @ 11:25) (20 - 20)  SpO2: 100% (06-03-20 @ 11:25) (95% - 100%)  Wt(kg): --  I&O's Summary    02 Jun 2020 07:01  -  03 Jun 2020 07:00  --------------------------------------------------------  IN: 360 mL / OUT: 0 mL / NET: 360 mL      Weight (kg): 65.8 (06-01 @ 20:52)      PHYSICAL EXAM:  Appearance: Comfortable. No acute distress  HEENT:  Head and neck: Atraumatic. Normocephalic.  Normal oral mucosa, PERRL, Neck is supple. No JVD, No carotid bruit.   Neurologic: A&Ox 3 with some confusion (per RN), no focal deficits. EOMI, Cranial nerves are intact.  Lymphatic: No cervical lymphadenopathy  Cardiovascular: Normal S1 S2, III/VI murmur, rubs/gallops. No JVD, b/l LE 2+ edema  Respiratory: b/l LL crackles  Gastrointestinal:  Soft, Non-tender, + BS  Lower Extremities: b/l LE 2+ edema  Psychiatry: Patient is calm. No agitation. Mood & affect appropriate  Skin: No rashes/ecchymoses/cyanosis/ulcers visualized on the face, hands or feet.      CURRENT MEDICATIONS:  furosemide   Injectable 40 milliGRAM(s) IV Push daily  metoprolol succinate  milliGRAM(s) Oral daily    mirtazapine  levothyroxine  simvastatin  apixaban  aspirin enteric coated  iron sucrose IVPB      DIAGNOSTIC TESTING:  [ ] Echocardiogram:   < from: TTE Echo Complete w/ Contrast w/ Doppler (06.02.20 @ 10:05) >  Summary:   1. Left ventricular ejection fraction, by visual estimation, is 65 to 70%.   2. Technically adequate study.   3. Normal global left ventricular systolic function.   4. Normal left ventricular internal cavity size.   5. The mitral in-flow pattern reveals no discernable A-wave, therefore no comment on diastolic function can be made.  6. There is no evidence of pericardial effusion.   7. Moderate tricuspid regurgitation.   8. Estimated pulmonary artery systolic pressure is 46.0 mmHg assuming a right atrial pressure of 3 mmHg, which is consistent with mild pulmonary hypertension.  9. Peak transaortic gradient equals 11.7 mmHg, mean transaortic gradient equals 6.5 mmHg, the calculated aortic valve area equals 1.11 cm² by the continuity equation consistent with moderate aortic stenosis.    MD Alban Electronically signedon 6/2/2020 at 4:31:10 PM    < end of copied text >    [ ]  Catheterization:  [ ] Stress Test:    OTHER: 	      LABS:	 	  CARDIAC MARKERS ( 01 Jun 2020 21:28 )  x     / 0.04 ng/mL / x     / x     / x      p-BNP 01 Jun 2020 21:28: 52414 pg/mL                          8.7    6.22  )-----------( 127      ( 03 Jun 2020 06:57 )             31.0     06-03    141  |  104  |  51.0<H>  ----------------------------<  101<H>  5.1   |  24.0  |  2.14<H>    Ca    8.4<L>      03 Jun 2020 06:56  Phos  5.7     06-03  Mg     2.2     06-03    TPro  6.8  /  Alb  3.6  /  TBili  0.3<L>  /  DBili  x   /  AST  25  /  ALT  22  /  AlkPhos  84  06-01    proBNP: Serum Pro-Brain Natriuretic Peptide: 69290 pg/mL (06-01 @ 21:28)    Lipid Profile:   HgA1c:   TSH:       TELEMETRY: AFib 80s to low 100s [place] [facility]ARDIOLOGY-[facility] Practice                                                               Office: [address] [address]                                                              Telephone: [phone]. Fax:[phone]                                                                             PROGRESS NOTE  Reason for follow up: CHF  Overnight: No new events.   Update: 6/3: Patient sleeping and difficult to arouse.  Per RN Rocio, pt is presenting with normal behavior.  Stated about an hour prior she was at her baseline a&ox3 w/some confusion awake, pleasant eating breakfast with no complaints. She states that when the patient sleeps she is VERY difficult to arouse.      Review of symptoms:   patient sleeping. does not wake to answer questions.  	  Vitals:  T(C): 36.8 (06-03-20 @ 11:25), Max: 36.8 (06-03-20 @ 11:25)  HR: 75 (06-03-20 @ 11:25) (75 - 98)  BP: 103/64 (06-03-20 @ 11:25) (103/64 - 138/53)  RR: 20 (06-03-20 @ 11:25) (20 - 20)  SpO2: 100% (06-03-20 @ 11:25) (95% - 100%)  Wt(kg): --  I&O's Summary    02 Jun 2020 07:01  -  03 Jun 2020 07:00  --------------------------------------------------------  IN: 360 mL / OUT: 0 mL / NET: 360 mL      Weight (kg): 65.8 (06-01 @ 20:52)      PHYSICAL EXAM:  Appearance: Comfortable. No acute distress  HEENT:  Head and neck: Atraumatic. Normocephalic.  Normal oral mucosa, PERRL, Neck is supple. No JVD, No carotid bruit.   Neurologic: A&Ox 3 with some confusion (per RN), no focal deficits. EOMI, Cranial nerves are intact.  Lymphatic: No cervical lymphadenopathy  Cardiovascular: Normal S1 S2, III/VI murmur, rubs/gallops. No JVD, b/l LE 2+ edema  Respiratory: b/l LL crackles  Gastrointestinal:  Soft, Non-tender, + BS  Lower Extremities: b/l LE 2+ edema  Psychiatry: Patient is calm. No agitation. Mood & affect appropriate  Skin: No rashes/ecchymoses/cyanosis/ulcers visualized on the face, hands or feet.      CURRENT MEDICATIONS:  furosemide   Injectable 40 milliGRAM(s) IV Push daily  metoprolol succinate  milliGRAM(s) Oral daily    mirtazapine  levothyroxine  simvastatin  apixaban  aspirin enteric coated  iron sucrose IVPB      DIAGNOSTIC TESTING:  [ ] Echocardiogram:   < from: TTE Echo Complete w/ Contrast w/ Doppler (06.02.20 @ 10:05) >  Summary:   1. Left ventricular ejection fraction, by visual estimation, is 65 to 70%.   2. Technically adequate study.   3. Normal global left ventricular systolic function.   4. Normal left ventricular internal cavity size.   5. The mitral in-flow pattern reveals no discernable A-wave, therefore no comment on diastolic function can be made.  6. There is no evidence of pericardial effusion.   7. Moderate tricuspid regurgitation.   8. Estimated pulmonary artery systolic pressure is 46.0 mmHg assuming a right atrial pressure of 3 mmHg, which is consistent with mild pulmonary hypertension.  9. Peak transaortic gradient equals 11.7 mmHg, mean transaortic gradient equals 6.5 mmHg, the calculated aortic valve area equals 1.11 cm² by the continuity equation consistent with moderate aortic stenosis.    MD Alban Electronically signedon 6/2/2020 at 4:31:10 PM    < end of copied text >    [ ]  Catheterization:  [ ] Stress Test:    OTHER: 	      LABS:	 	  CARDIAC MARKERS ( 01 Jun 2020 21:28 )  x     / 0.04 ng/mL / x     / x     / x      p-BNP 01 Jun 2020 21:28: 28409 pg/mL                          8.7    6.22  )-----------( 127      ( 03 Jun 2020 06:57 )             31.0     06-03    141  |  104  |  51.0<H>  ----------------------------<  101<H>  5.1   |  24.0  |  2.14<H>    Ca    8.4<L>      03 Jun 2020 06:56  Phos  5.7     06-03  Mg     2.2     06-03    TPro  6.8  /  Alb  3.6  /  TBili  0.3<L>  /  DBili  x   /  AST  25  /  ALT  22  /  AlkPhos  84  06-01    proBNP: Serum Pro-Brain Natriuretic Peptide: 51445 pg/mL (06-01 @ 21:28)    TELEMETRY: AFib 80s to low 100s

## 2020-06-03 NOTE — PROGRESS NOTE ADULT - SUBJECTIVE AND OBJECTIVE BOX
CHIEF COMPLAINT/INTERVAL HISTORY:    Patient is a 89y old  Female who presents with a chief complaint of acute hypoxemic respiratory failure due to decompensated CHF, hyperkalemia (2020 22:22)    SUBJECTIVE & OBJECTIVE: Pt seen and examined at bedside. No overnight events. Patient reports SOB has improved; diuresing well. Will wean O2 as tolerated. Switch to PO lasix in 1-2 days.     ROS: No chest pain, palpitations, SOB, light headedness, dizziness, headache, nausea/vomiting, fevers/chills, abdominal pain, dysuria or increased urinary frequency.    ICU Vital Signs Last 24 Hrs  T(C): 36.4 (2020 07:26), Max: 36.6 (2020 11:38)  T(F): 97.5 (2020 07:26), Max: 97.9 (2020 11:38)  HR: 91 (2020 07:26) (78 - 98)  BP: 112/59 (2020 07:26) (112/59 - 138/53)  RR: 20 (2020 07:26) (20 - 20)  SpO2: 100% (2020 07:26) (95% - 100%)    MEDICATIONS  (STANDING):  apixaban 2.5 milliGRAM(s) Oral two times a day  aspirin enteric coated 81 milliGRAM(s) Oral daily  furosemide   Injectable 40 milliGRAM(s) IV Push daily  iron sucrose IVPB 200 milliGRAM(s) IV Intermittent every 24 hours  levothyroxine 100 MICROGram(s) Oral daily  metoprolol succinate  milliGRAM(s) Oral daily  mirtazapine 30 milliGRAM(s) Oral at bedtime  simvastatin 40 milliGRAM(s) Oral at bedtime  sodium zirconium cyclosilicate 10 Gram(s) Oral every 8 hours    MEDICATIONS  (PRN):  acetaminophen   Tablet .. 650 milliGRAM(s) Oral every 6 hours PRN Temp greater or equal to 38C (100.4F), Mild Pain (1 - 3)  guaiFENesin  milliGRAM(s) Oral every 12 hours PRN Cough  temazepam 7.5 milliGRAM(s) Oral at bedtime PRN Insomnia      LABS:                        8.7    6.22  )-----------( 127      ( 2020 06:57 )             31.0     06-03    141  |  104  |  51.0<H>  ----------------------------<  101<H>  5.1   |  24.0  |  2.14<H>    Ca    8.4<L>      2020 06:56  Phos  5.7     06-03  Mg     2.2     06-03    TPro  6.8  /  Alb  3.6  /  TBili  0.3<L>  /  DBili  x   /  AST  25  /  ALT  22  /  AlkPhos  84  06-01    PT/INR - ( 2020 21:28 )   PT: 17.6 sec;   INR: 1.54 ratio         PTT - ( 2020 21:28 )  PTT:29.6 sec  Urinalysis Basic - ( 2020 04:08 )    Color: Yellow / Appearance: Clear / S.010 / pH: x  Gluc: x / Ketone: Negative  / Bili: Negative / Urobili: Negative mg/dL   Blood: x / Protein: 15 mg/dL / Nitrite: Negative   Leuk Esterase: Small / RBC: Negative /HPF / WBC 3-5   Sq Epi: x / Non Sq Epi: Few / Bacteria: Occasional    PHYSICAL EXAM:    GENERAL: elderly female, chronically ill appearing but in no acute distress  HEAD:  Atraumatic, Normocephalic  EYES: EOMI, PERRLA, conjunctiva and sclera clear  ENMT: Moist mucous membranes  NECK: Supple   NERVOUS SYSTEM:  Alert & Oriented X3   CHEST/LUNG: coarse breath sounds  HEART: Regular rate and rhythm; + S1/S2, + murmur  ABDOMEN: Soft, Nontender, Obese, + BS  EXTREMITIES:  ++ LE edema

## 2020-06-03 NOTE — PROGRESS NOTE ADULT - ASSESSMENT
Patient is a 89 year old female with PMH of Dementia, Afib on Eliquis, HLD , Hypothyroidism and Depression who was BIBA from [facility] after CXR was concerning for PNA . In the ER pt found to be in hypoxic respiratory failure most likely 2/2 to CHF, hyperkalemia and elevated Cr and BUN.      1-Acute Hypoxic Respiratory failure 2/2 to Acute on Chronic Diastolic CHF  CXR w/ cardiomegaly , congestion and bilateral small pleural effusion   BNP>40572  s/p rocephin/azit in the ER   low suspicion for PNA (remains afebrile, no leukocytosis and normal procal)  c/w lasix 40 mg IV daily today; switch to PO in 1-2 days  strict I/Os, daily weights  trop neg x 1   TTE reviewed; preserved EF, moderate TR, moderate AS, mild pulm HTN  wean O2 as tolerated; currently on 4 liters (not O2 dependent prior to admission)  f/u cardio recommendations      2-Hyperkalemia - resolved  continue lasix and lokelma  low K + diet  renal consult appreciated    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  unknown baseline; but family reports patient was seeing a nephrologist as outpatient  creatinine 2.12 today  UA reviewed  renal US - no hydro  continue lasix and monitor degree of azotemia  avoid nephrotoxic agents  renally dose all medications  renal recommendations appreciated    4-Anemia; likely anemia of chronic kidney disease  Hb= 8.7 (goal Hb 10-11)  iron studies reviewed; start IV venofer  epo per renal    5-Afib   c/w eliquis 2.5 BID  c/w metoprolol succinate     6- Depression   c/w mirtazapine at bed time  PRN temazepam     7-Hypothyroidism   c/w levothyroxine     DVT ppx - Eliquis     Dispo - PT eval- return to [facility] when medically stable; transition to PO lasix in 1-2 days. Palliative care consult appreciated - full code.      Attending Attestation:   Plan discussed with patient, RN.

## 2020-06-04 LAB
ANION GAP SERPL CALC-SCNC: 11 MMOL/L — SIGNIFICANT CHANGE UP (ref 5–17)
BUN SERPL-MCNC: 51 MG/DL — HIGH (ref 8–20)
CALCIUM SERPL-MCNC: 8.6 MG/DL — SIGNIFICANT CHANGE UP (ref 8.6–10.2)
CHLORIDE SERPL-SCNC: 102 MMOL/L — SIGNIFICANT CHANGE UP (ref 98–107)
CO2 SERPL-SCNC: 28 MMOL/L — SIGNIFICANT CHANGE UP (ref 22–29)
CREAT SERPL-MCNC: 2.05 MG/DL — HIGH (ref 0.5–1.3)
GLUCOSE SERPL-MCNC: 166 MG/DL — HIGH (ref 70–99)
HCT VFR BLD CALC: 29.5 % — LOW (ref 34.5–45)
HGB BLD-MCNC: 8.5 G/DL — LOW (ref 11.5–15.5)
MAGNESIUM SERPL-MCNC: 2 MG/DL — SIGNIFICANT CHANGE UP (ref 1.6–2.6)
MCHC RBC-ENTMCNC: 28.8 GM/DL — LOW (ref 32–36)
MCHC RBC-ENTMCNC: 29.1 PG — SIGNIFICANT CHANGE UP (ref 27–34)
MCV RBC AUTO: 101 FL — HIGH (ref 80–100)
PHOSPHATE SERPL-MCNC: 4.9 MG/DL — HIGH (ref 2.4–4.7)
PLATELET # BLD AUTO: 134 K/UL — LOW (ref 150–400)
POTASSIUM SERPL-MCNC: 3.8 MMOL/L — SIGNIFICANT CHANGE UP (ref 3.5–5.3)
POTASSIUM SERPL-SCNC: 3.8 MMOL/L — SIGNIFICANT CHANGE UP (ref 3.5–5.3)
RBC # BLD: 2.92 M/UL — LOW (ref 3.8–5.2)
RBC # FLD: 16.9 % — HIGH (ref 10.3–14.5)
SODIUM SERPL-SCNC: 141 MMOL/L — SIGNIFICANT CHANGE UP (ref 135–145)
WBC # BLD: 7.48 K/UL — SIGNIFICANT CHANGE UP (ref 3.8–10.5)
WBC # FLD AUTO: 7.48 K/UL — SIGNIFICANT CHANGE UP (ref 3.8–10.5)

## 2020-06-04 PROCEDURE — 99233 SBSQ HOSP IP/OBS HIGH 50: CPT

## 2020-06-04 PROCEDURE — 99232 SBSQ HOSP IP/OBS MODERATE 35: CPT

## 2020-06-04 RX ORDER — FUROSEMIDE 40 MG
40 TABLET ORAL DAILY
Refills: 0 | Status: DISCONTINUED | OUTPATIENT
Start: 2020-06-05 | End: 2020-06-05

## 2020-06-04 RX ORDER — ERYTHROPOIETIN 10000 [IU]/ML
10000 INJECTION, SOLUTION INTRAVENOUS; SUBCUTANEOUS ONCE
Refills: 0 | Status: COMPLETED | OUTPATIENT
Start: 2020-06-04 | End: 2020-06-04

## 2020-06-04 RX ADMIN — Medication 40 MILLIGRAM(S): at 05:58

## 2020-06-04 RX ADMIN — APIXABAN 2.5 MILLIGRAM(S): 2.5 TABLET, FILM COATED ORAL at 05:58

## 2020-06-04 RX ADMIN — Medication 81 MILLIGRAM(S): at 12:44

## 2020-06-04 RX ADMIN — ERYTHROPOIETIN 10000 UNIT(S): 10000 INJECTION, SOLUTION INTRAVENOUS; SUBCUTANEOUS at 17:51

## 2020-06-04 RX ADMIN — MIRTAZAPINE 30 MILLIGRAM(S): 45 TABLET, ORALLY DISINTEGRATING ORAL at 20:15

## 2020-06-04 RX ADMIN — Medication 650 MILLIGRAM(S): at 12:45

## 2020-06-04 RX ADMIN — Medication 100 MICROGRAM(S): at 05:58

## 2020-06-04 RX ADMIN — IRON SUCROSE 110 MILLIGRAM(S): 20 INJECTION, SOLUTION INTRAVENOUS at 12:44

## 2020-06-04 RX ADMIN — SIMVASTATIN 40 MILLIGRAM(S): 20 TABLET, FILM COATED ORAL at 20:15

## 2020-06-04 RX ADMIN — SODIUM ZIRCONIUM CYCLOSILICATE 10 GRAM(S): 10 POWDER, FOR SUSPENSION ORAL at 05:58

## 2020-06-04 RX ADMIN — APIXABAN 2.5 MILLIGRAM(S): 2.5 TABLET, FILM COATED ORAL at 17:51

## 2020-06-04 NOTE — PROGRESS NOTE ADULT - ASSESSMENT
1) Acute kidney injury on ckd- unknown baseline stage  2) Hyperkalemia; s/p insulin, D50, bicarb and lasix; now on Lokelma  3) CHFrEF- TTE with preserved EF, moderate TR, moderate AS, mild pulm HTN  4) Anemia of CKD with superimposed iron def      Renal US with atrophic right kidney; b/l cortical thickening and increased echogeniticy; no hydronephrosis  Scr improving with diuresis  Continue diuresis  Continue IV iron, will give Epo 1000 U SC x1  Monitor SCr, lytes, UOP

## 2020-06-04 NOTE — PROGRESS NOTE ADULT - SUBJECTIVE AND OBJECTIVE BOX
[facility] DIVISION OF KIDNEY DISEASES AND HYPERTENSION -- FOLLOW UP NOTE  --------------------------------------------------------------------------------  Chief Complaint: kandy on ckd    24 hour events/subjective:  no acute event  pt seen and examined; feels well      PAST HISTORY  --------------------------------------------------------------------------------  No significant changes to PMH, PSH, FHx, SHx, unless otherwise noted    ALLERGIES & MEDICATIONS  --------------------------------------------------------------------------------  Allergies    No Known Allergies    Intolerances      Standing Inpatient Medications  apixaban 2.5 milliGRAM(s) Oral two times a day  aspirin enteric coated 81 milliGRAM(s) Oral daily  iron sucrose IVPB 200 milliGRAM(s) IV Intermittent every 24 hours  levothyroxine 100 MICROGram(s) Oral daily  metoprolol succinate  milliGRAM(s) Oral daily  mirtazapine 30 milliGRAM(s) Oral at bedtime  simvastatin 40 milliGRAM(s) Oral at bedtime    PRN Inpatient Medications  acetaminophen   Tablet .. 650 milliGRAM(s) Oral every 6 hours PRN  guaiFENesin  milliGRAM(s) Oral every 12 hours PRN  temazepam 7.5 milliGRAM(s) Oral at bedtime PRN      REVIEW OF SYSTEMS  --------------------------------------------------------------------------------  Gen: No weight changes, fatigue, fevers/chills, weakness  Skin: No rashes  Head/Eyes/Ears/Mouth: No headache; Normal hearing; Normal vision w/o blurriness; No sinus pain/discomfort, sore throat  Respiratory: No dyspnea, cough, wheezing, hemoptysis  CV: No chest pain, PND, orthopnea  GI: No abdominal pain, diarrhea, constipation, nausea, vomiting, melena, hematochezia  : No increased frequency, dysuria, hematuria, nocturia  MSK: No joint pain/swelling; no back pain; no edema  Neuro: No dizziness/lightheadedness, weakness, seizures, numbness, tingling  Heme: No easy bruising or bleeding  Endo: No heat/cold intolerance  Psych: No significant nervousness, anxiety, stress, depression    All other systems were reviewed and are negative, except as noted.    VITALS/PHYSICAL EXAM  --------------------------------------------------------------------------------  T(C): 36.5 (06-04-20 @ 09:38), Max: 36.8 (06-03-20 @ 15:22)  HR: 86 (06-04-20 @ 09:38) (77 - 86)  BP: 124/67 (06-04-20 @ 09:38) (98/68 - 129/72)  RR: 18 (06-04-20 @ 09:38) (18 - 20)  SpO2: 98% (06-04-20 @ 09:38) (94% - 100%)  Wt(kg): --        06-04-20 @ 07:01  -  06-04-20 @ 15:01  --------------------------------------------------------  IN: 420 mL / OUT: 0 mL / NET: 420 mL      Physical Exam:  	Gen: NAD, sitting in chair  	HEENT: , supple neck, on 2 L NC  	Pulm: b/l crackles  	CV: RRR, S1S2; no rub  	Back: No spinal or CVA tenderness; no sacral edema  	Abd: +BS, soft, nontender/nondistended  	: No suprapubic tenderness  	UE: Warm, no edema; no asterixis  	LE: Warm, + edema  	Neuro: No focal deficits  	Psych: Normal affect and mood  	Skin: Warm, without rashes      LABS/STUDIES  --------------------------------------------------------------------------------              8.5    7.48  >-----------<  134      [06-04-20 @ 11:41]              29.5     141  |  102  |  51.0  ----------------------------<  166      [06-04-20 @ 11:41]  3.8   |  28.0  |  2.05        Ca     8.6     [06-04-20 @ 11:41]      Mg     2.0     [06-04-20 @ 11:41]      Phos  4.9     [06-04-20 @ 11:41]            Creatinine Trend:  SCr 2.05 [06-04 @ 11:41]  SCr 2.14 [06-03 @ 06:56]  SCr 2.45 [06-02 @ 16:57]  SCr 2.38 [06-02 @ 08:32]  SCr 2.32 [06-02 @ 04:21]    Urinalysis - [06-02-20 @ 04:08]      Color Yellow / Appearance Clear / SG 1.010 / pH 5.0      Gluc Negative / Ketone Negative  / Bili Negative / Urobili Negative       Blood Moderate / Protein 15 / Leuk Est Small / Nitrite Negative      RBC Negative / WBC 3-5 / Hyaline  / Gran  / Sq Epi  / Non Sq Epi Few / Bacteria Occasional      Iron 27, TIBC 307, %sat 9      [06-03-20 @ 06:56]  Ferritin 51      [06-03-20 @ 06:56]

## 2020-06-04 NOTE — PROGRESS NOTE ADULT - SUBJECTIVE AND OBJECTIVE BOX
CHIEF COMPLAINT/INTERVAL HISTORY:    Patient is a 89y old  Female who presents with a chief complaint of acute hypoxemic respiratory failure due to decompensated CHF, hyperkalemia (03 Jun 2020 13:01)    SUBJECTIVE & OBJECTIVE: Pt seen and examined at bedside. No overnight events. Patient reports feeling better today. Weaned off O2. Switch to PO lasix. PT follow up requested; patient can not return to [facility] given level of assistance. SW to follow up with family in regards to DANIE. Labs pending.    ROS: No chest pain, palpitations, SOB, light headedness, dizziness, headache, nausea/vomiting, fevers/chills, abdominal pain.    ICU Vital Signs Last 24 Hrs  T(C): 36.5 (04 Jun 2020 09:38), Max: 36.8 (03 Jun 2020 15:22)  T(F): 97.7 (04 Jun 2020 09:38), Max: 98.3 (03 Jun 2020 19:43)  HR: 86 (04 Jun 2020 09:38) (77 - 86)  BP: 124/67 (04 Jun 2020 09:38) (98/68 - 129/72)  RR: 18 (04 Jun 2020 09:38) (18 - 20)  SpO2: 98% (04 Jun 2020 09:38) (94% - 100%)    MEDICATIONS  (STANDING):  apixaban 2.5 milliGRAM(s) Oral two times a day  aspirin enteric coated 81 milliGRAM(s) Oral daily  iron sucrose IVPB 200 milliGRAM(s) IV Intermittent every 24 hours  levothyroxine 100 MICROGram(s) Oral daily  metoprolol succinate  milliGRAM(s) Oral daily  mirtazapine 30 milliGRAM(s) Oral at bedtime  simvastatin 40 milliGRAM(s) Oral at bedtime    MEDICATIONS  (PRN):  acetaminophen   Tablet .. 650 milliGRAM(s) Oral every 6 hours PRN Temp greater or equal to 38C (100.4F), Mild Pain (1 - 3)  guaiFENesin  milliGRAM(s) Oral every 12 hours PRN Cough  temazepam 7.5 milliGRAM(s) Oral at bedtime PRN Insomnia      LABS:                        8.7    6.22  )-----------( 127      ( 03 Jun 2020 06:57 )             31.0     06-03    141  |  104  |  51.0<H>  ----------------------------<  101<H>  5.1   |  24.0  |  2.14<H>    Ca    8.4<L>      03 Jun 2020 06:56  Phos  5.7     06-03  Mg     2.2     06-03    PHYSICAL EXAM:    GENERAL: elderly female, chronically ill appearing but in no acute distress  HEAD:  Atraumatic, Normocephalic  EYES: EOMI, PERRLA, conjunctiva and sclera clear  ENMT: Moist mucous membranes  NECK: Supple   NERVOUS SYSTEM:  Alert & Oriented X3   CHEST/LUNG: coarse breath sounds  HEART: Regular rate and rhythm; + S1/S2, + murmur  ABDOMEN: Soft, Nontender, Obese, + BS  EXTREMITIES:  + LE edema

## 2020-06-04 NOTE — PROGRESS NOTE ADULT - ASSESSMENT
Patient is a 89 year old female with PMH of Dementia, Afib on Eliquis, HLD , Hypothyroidism and Depression who was BIBA from [facility] after CXR was concerning for PNA . In the ER pt found to be in hypoxic respiratory failure most likely 2/2 to CHF, hyperkalemia and elevated Cr and BUN.      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  unknown baseline; but family reports patient was seeing a nephrologist as outpatient  creatinine 2.12 yesterday; repeat labs today  UA reviewed  renal US - no hydro  continue lasix and monitor degree of azotemia  avoid nephrotoxic agents  renally dose all medications  renal recommendations appreciated    4-Anemia; likely anemia of chronic kidney disease  Hb= 8.7 (goal Hb 10-11)  iron studies reviewed; start IV venofer  epo per renal  f/u CBC    5-Afib   c/w eliquis 2.5 BID  c/w metoprolol succinate     6- Depression   c/w mirtazapine at bed time  PRN temazepam     7-Hypothyroidism   c/w levothyroxine     DVT ppx - Eliquis     Dispo - Seen by PT - patient is not meet criteria to return to [facility]. Will need DANIE. RACHEL on board to help arrange for DANIE. If labs are stable today; patient is medically stable for discharge.    Attending Attestation:   Plan discussed with patient, RN, SW

## 2020-06-05 VITALS
OXYGEN SATURATION: 99 % | SYSTOLIC BLOOD PRESSURE: 99 MMHG | HEART RATE: 72 BPM | DIASTOLIC BLOOD PRESSURE: 61 MMHG | RESPIRATION RATE: 20 BRPM | TEMPERATURE: 98 F

## 2020-06-05 PROCEDURE — U0003: CPT

## 2020-06-05 PROCEDURE — 76775 US EXAM ABDO BACK WALL LIM: CPT

## 2020-06-05 PROCEDURE — 94640 AIRWAY INHALATION TREATMENT: CPT

## 2020-06-05 PROCEDURE — 99233 SBSQ HOSP IP/OBS HIGH 50: CPT

## 2020-06-05 PROCEDURE — 83605 ASSAY OF LACTIC ACID: CPT

## 2020-06-05 PROCEDURE — 96365 THER/PROPH/DIAG IV INF INIT: CPT

## 2020-06-05 PROCEDURE — 81001 URINALYSIS AUTO W/SCOPE: CPT

## 2020-06-05 PROCEDURE — 85730 THROMBOPLASTIN TIME PARTIAL: CPT

## 2020-06-05 PROCEDURE — 82728 ASSAY OF FERRITIN: CPT

## 2020-06-05 PROCEDURE — 84466 ASSAY OF TRANSFERRIN: CPT

## 2020-06-05 PROCEDURE — 97116 GAIT TRAINING THERAPY: CPT

## 2020-06-05 PROCEDURE — 83735 ASSAY OF MAGNESIUM: CPT

## 2020-06-05 PROCEDURE — 84484 ASSAY OF TROPONIN QUANT: CPT

## 2020-06-05 PROCEDURE — 83540 ASSAY OF IRON: CPT

## 2020-06-05 PROCEDURE — 71045 X-RAY EXAM CHEST 1 VIEW: CPT

## 2020-06-05 PROCEDURE — 84100 ASSAY OF PHOSPHORUS: CPT

## 2020-06-05 PROCEDURE — 86140 C-REACTIVE PROTEIN: CPT

## 2020-06-05 PROCEDURE — 97163 PT EVAL HIGH COMPLEX 45 MIN: CPT

## 2020-06-05 PROCEDURE — 83880 ASSAY OF NATRIURETIC PEPTIDE: CPT

## 2020-06-05 PROCEDURE — 99239 HOSP IP/OBS DSCHRG MGMT >30: CPT

## 2020-06-05 PROCEDURE — 82746 ASSAY OF FOLIC ACID SERUM: CPT

## 2020-06-05 PROCEDURE — 80048 BASIC METABOLIC PNL TOTAL CA: CPT

## 2020-06-05 PROCEDURE — 87040 BLOOD CULTURE FOR BACTERIA: CPT

## 2020-06-05 PROCEDURE — 97530 THERAPEUTIC ACTIVITIES: CPT

## 2020-06-05 PROCEDURE — 99285 EMERGENCY DEPT VISIT HI MDM: CPT | Mod: 25

## 2020-06-05 PROCEDURE — 85027 COMPLETE CBC AUTOMATED: CPT

## 2020-06-05 PROCEDURE — 84145 PROCALCITONIN (PCT): CPT

## 2020-06-05 PROCEDURE — 82607 VITAMIN B-12: CPT

## 2020-06-05 PROCEDURE — 82962 GLUCOSE BLOOD TEST: CPT

## 2020-06-05 PROCEDURE — 36415 COLL VENOUS BLD VENIPUNCTURE: CPT

## 2020-06-05 PROCEDURE — 93005 ELECTROCARDIOGRAM TRACING: CPT

## 2020-06-05 PROCEDURE — 80053 COMPREHEN METABOLIC PANEL: CPT

## 2020-06-05 PROCEDURE — 83550 IRON BINDING TEST: CPT

## 2020-06-05 PROCEDURE — 96375 TX/PRO/DX INJ NEW DRUG ADDON: CPT

## 2020-06-05 PROCEDURE — 85610 PROTHROMBIN TIME: CPT

## 2020-06-05 PROCEDURE — C8929: CPT

## 2020-06-05 RX ORDER — FERROUS SULFATE 325(65) MG
1 TABLET ORAL
Qty: 60 | Refills: 0
Start: 2020-06-05 | End: 2020-07-04

## 2020-06-05 RX ORDER — FUROSEMIDE 40 MG
1 TABLET ORAL
Qty: 0 | Refills: 0 | DISCHARGE
Start: 2020-06-05

## 2020-06-05 RX ADMIN — IRON SUCROSE 110 MILLIGRAM(S): 20 INJECTION, SOLUTION INTRAVENOUS at 10:09

## 2020-06-05 RX ADMIN — Medication 100 MILLIGRAM(S): at 05:26

## 2020-06-05 RX ADMIN — APIXABAN 2.5 MILLIGRAM(S): 2.5 TABLET, FILM COATED ORAL at 05:26

## 2020-06-05 RX ADMIN — Medication 40 MILLIGRAM(S): at 05:26

## 2020-06-05 RX ADMIN — Medication 100 MICROGRAM(S): at 05:26

## 2020-06-05 RX ADMIN — Medication 81 MILLIGRAM(S): at 10:08

## 2020-06-05 NOTE — DIETITIAN INITIAL EVALUATION ADULT. - MALNUTRITION
Limited NFPE performed- Moderate muscle wasting at temples.  Moderate fat loss in orbital region moderate (chronic)

## 2020-06-05 NOTE — PROGRESS NOTE ADULT - REASON FOR ADMISSION
acute hypoxemic respiratory failure due to decompensated CHF, hyperkalemia

## 2020-06-05 NOTE — DIETITIAN INITIAL EVALUATION ADULT. - ETIOLOGY
related to inability to consume increased protein energy intake in setting of advanced age, dementia and hypoxic respiratory failure due to CHF

## 2020-06-05 NOTE — DISCHARGE NOTE PROVIDER - NSDCMRMEDTOKEN_GEN_ALL_CORE_FT
Aspir 81 oral delayed release tablet: 1 tab(s) orally once a day  Eliquis 2.5 mg oral tablet: 1 tab(s) orally 2 times a day  FeroSul 325 mg (65 mg elemental iron) oral tablet: 1 tab(s) orally 2 times a day   furosemide 40 mg oral tablet: 1 tab(s) orally once a day  levothyroxine 100 mcg (0.1 mg) oral tablet: 1 tab(s) orally once a day  metoprolol succinate 100 mg oral tablet, extended release: 1 tab(s) orally once a day  mirtazapine 30 mg oral tablet: 1 tab(s) orally once a day (at bedtime)  simvastatin 40 mg oral tablet: 1 tab(s) orally once a day (at bedtime)  temazepam 7.5 mg oral capsule: 1 cap(s) orally once a day (at bedtime), As Needed

## 2020-06-05 NOTE — CHART NOTE - NSCHARTNOTEFT_GEN_A_CORE
Upon Nutritional Assessment by the Registered Dietitian your patient was determined to meet criteria / has evidence of the following diagnosis/diagnoses:          [ ]  Mild Protein Calorie Malnutrition        [x ]  Moderate Protein Calorie Malnutrition        [ ] Severe Protein Calorie Malnutrition        [ ] Unspecified Protein Calorie Malnutrition        [ ] Underweight / BMI <19        [ ] Morbid Obesity / BMI > 40      Findings as based on:  •  Comprehensive nutrition assessment and consultation  •  Calorie counts (nutrient intake analysis)  •  Food acceptance and intake status from observations by staff  •  Follow up  •  Patient education  •  Intervention secondary to interdisciplinary rounds  •   concerns      Treatment:    The following diet has been recommended:  RX: Nepro BID    PROVIDER Section:     By signing this assessment you are acknowledging and agree with the diagnosis/diagnoses assigned by the Registered Dietitian    Comments:

## 2020-06-05 NOTE — GOALS OF CARE CONVERSATION - ADVANCED CARE PLANNING - CONVERSATION DETAILS
Called and spoke with nephjuan m Angela to introduce palliative service. He then conference in his cousin Ashwin (who lives locally). Both nephews are actively involved in patient's medical care. Pt was living independently and alone until 1 month ago after having 2 falls within a week at home that prompted the decision to relocate to [facility] assisted living facility. She is ,   passed 15 yrs ago and never had children.     At baseline, pt is mostly independent of ADLs and ambulates with cane/walker. No falls reported since being at [facility] that family is aware of. She occasionally is forgetful but otherwise cognitively is doing well per nephews.     Ashwin was able to clarify that pt has known history of CHF, followed by cardiologist in [place] with last apt ~8 months ago and informed doing well and to return in 1 year. She was discontinued off lasix ~7-10 days ago, family unclear why but presumed it was related to her kidney. Pt also followed by OP nephrologist for known CKD.    Advance Directives reviewed. Julio César shared that he is designated HCP with paperwork at home which he will provide to hospital. There is no living will nor any prior conversations regarding pt's wishes for heroic life sustaining interventions such as CPR or intubation. The nephews at this time have differing opinions. Ashwin feels it would be appropriate to consider a DNR/I whereas Julio César would want a trial of both CPR/intubation. Family agreed that they would want to discuss this with patient in person and hopes to do so when pt returns back to facility. I informed them at this time she will be FULL CODE and all interventions would be provided in the unexpected event of cardiopulmonary arrest. They acknowledged understanding.    GOC at this time is to continue with all medical interventions and monitor for interval improvement. Family hopes for her to return back to facility when medically stable.      Palliative Care Social Work note 6/5. SW spoke with patients melvin Angela and his wife to offer support in coping with patients hospitalization as well revie questions and concerns. family engaged with SW addressing concern for patient to know that they care and are involved since they have not been able to speak with her directly. Family acknowledges patient depressed recently with lifestyle changes and adjustments form leaving her home and going to Assisted Living and now planning for subacute. Medical questions family had conveyed to hospitalist Dr Mcdonald. Palliative following for support.

## 2020-06-05 NOTE — DISCHARGE NOTE PROVIDER - HOSPITAL COURSE
Patient is a 89 year old female with PMH of Dementia, Afib on Eliquis, HLD , Hypothyroidism and Depression who was BIBA from [facility] living [facility] after CXR was concerning for PNA . In the ER pt found to be in hypoxic respiratory failure most likely 2/2 to CHF, hyperkalemia and elevated Cr and BUN.          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    unknown baseline; but family reports patient was seeing a nephrologist as outpatient    creatinine stable 2.05    UA reviewed    renal US - no hydro    continue lasix      follow up with nephrologist as outpatient    repeat labs on Monday        4-Anemia; likely anemia of chronic kidney disease    Hb= stable (goal Hb 10-11)    iron studies reviewed; s/p IV venofer    received EPO on 6/4        5-Afib     c/w eliquis 2.5 BID    c/w metoprolol succinate         6- Depression     c/w mirtazapine at bed time    PRN temazepam         7-Hypothyroidism     c/w levothyroxine         Medically stable for discharge to rehab. Family updated. Time spent on discharge 45 minutes. Patient is a 89 year old female with PMH of Dementia, Afib on Eliquis, HLD , Hypothyroidism and Depression who was BIBA from [facility] living [facility] after CXR was concerning for PNA . In the ER pt found to be in hypoxic respiratory failure most likely 2/2 to CHF, hyperkalemia and elevated Cr and BUN.          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    unknown baseline; but family reports patient was seeing a nephrologist as outpatient    creatinine stable 2.05    UA reviewed    renal US - no hydro    continue lasix      follow up with nephrologist as outpatient    repeat labs on Monday        4-Anemia; likely anemia of chronic kidney disease    Hb= stable (goal Hb 10-11)    iron studies reviewed; s/p IV venofer    received EPO on 6/4        5-Afib     c/w eliquis 2.5 BID    c/w metoprolol succinate         6- Depression     c/w mirtazapine at bed time    PRN temazepam         7-Hypothyroidism     c/w levothyroxine         8- Moderate Protein Calorie Malnutrition     -nephrovite    -nutrition evaluation appreciated        Medically stable for discharge to rehab. Family updated. Time spent on discharge 45 minutes.

## 2020-06-05 NOTE — DISCHARGE NOTE PROVIDER - CARE PROVIDER_API CALL
Lashawn Dixon)  Medicine  Nephrology  [address] [place] [address]  Phone: [phone]  Fax: [phone]  Follow Up Time:     Dale Mac  CARDIOVASCULAR DISEASE  [address] [place] [id number]  Phone: [phone]  Fax: [phone]  Follow Up Time:

## 2020-06-05 NOTE — PROGRESS NOTE ADULT - SUBJECTIVE AND OBJECTIVE BOX
[facility] DIVISION OF KIDNEY DISEASES AND HYPERTENSION -- FOLLOW UP NOTE  --------------------------------------------------------------------------------  Chief Complaint:    24 hour events/subjective:        PAST HISTORY  --------------------------------------------------------------------------------  No significant changes to PMH, PSH, FHx, SHx, unless otherwise noted    ALLERGIES & MEDICATIONS  --------------------------------------------------------------------------------  Allergies    No Known Allergies    Intolerances      Standing Inpatient Medications  apixaban 2.5 milliGRAM(s) Oral two times a day  aspirin enteric coated 81 milliGRAM(s) Oral daily  furosemide    Tablet 40 milliGRAM(s) Oral daily  iron sucrose IVPB 200 milliGRAM(s) IV Intermittent every 24 hours  levothyroxine 100 MICROGram(s) Oral daily  metoprolol succinate  milliGRAM(s) Oral daily  mirtazapine 30 milliGRAM(s) Oral at bedtime  simvastatin 40 milliGRAM(s) Oral at bedtime    PRN Inpatient Medications  acetaminophen   Tablet .. 650 milliGRAM(s) Oral every 6 hours PRN  guaiFENesin  milliGRAM(s) Oral every 12 hours PRN  temazepam 7.5 milliGRAM(s) Oral at bedtime PRN      REVIEW OF SYSTEMS  --------------------------------------------------------------------------------  Gen: No weight changes, fatigue, fevers/chills, weakness  Skin: No rashes  Head/Eyes/Ears/Mouth: No headache; Normal hearing; Normal vision w/o blurriness; No sinus pain/discomfort, sore throat  Respiratory: No dyspnea, cough, wheezing, hemoptysis  CV: No chest pain, PND, orthopnea  GI: No abdominal pain, diarrhea, constipation, nausea, vomiting, melena, hematochezia  : No increased frequency, dysuria, hematuria, nocturia  MSK: No joint pain/swelling; no back pain; no edema  Neuro: No dizziness/lightheadedness, weakness, seizures, numbness, tingling  Heme: No easy bruising or bleeding  Endo: No heat/cold intolerance  Psych: No significant nervousness, anxiety, stress, depression    All other systems were reviewed and are negative, except as noted.    VITALS/PHYSICAL EXAM  --------------------------------------------------------------------------------  T(C): 36.5 (06-05-20 @ 09:27), Max: 36.8 (06-04-20 @ 16:27)  HR: 85 (06-05-20 @ 09:27) (79 - 91)  BP: 132/75 (06-05-20 @ 09:27) (94/60 - 132/75)  RR: 20 (06-05-20 @ 09:27) (18 - 20)  SpO2: 100% (06-05-20 @ 09:27) (95% - 100%)  Wt(kg): --        06-04-20 @ 07:01  -  06-05-20 @ 07:00  --------------------------------------------------------  IN: 420 mL / OUT: 500 mL / NET: -80 mL    06-05-20 @ 07:01  -  06-05-20 @ 11:25  --------------------------------------------------------  IN: 240 mL / OUT: 0 mL / NET: 240 mL      Physical Exam:  	Gen: NAD, well-appearing  	HEENT: PERRL, supple neck, clear oropharynx  	Pulm: CTA B/L  	CV: RRR, S1S2; no rub  	Back: No spinal or CVA tenderness; no sacral edema  	Abd: +BS, soft, nontender/nondistended  	: No suprapubic tenderness  	UE: Warm, FROM, no clubbing, intact strength; no edema; no asterixis  	LE: Warm, FROM, no clubbing, intact strength; no edema  	Neuro: No focal deficits, intact gait  	Psych: Normal affect and mood  	Skin: Warm, without rashes  	Vascular access:    LABS/STUDIES  --------------------------------------------------------------------------------              8.5    7.48  >-----------<  134      [06-04-20 @ 11:41]              29.5     141  |  102  |  51.0  ----------------------------<  166      [06-04-20 @ 11:41]  3.8   |  28.0  |  2.05        Ca     8.6     [06-04-20 @ 11:41]      Mg     2.0     [06-04-20 @ 11:41]      Phos  4.9     [06-04-20 @ 11:41]            Creatinine Trend:  SCr 2.05 [06-04 @ 11:41]  SCr 2.14 [06-03 @ 06:56]  SCr 2.45 [06-02 @ 16:57]  SCr 2.38 [06-02 @ 08:32]  SCr 2.32 [06-02 @ 04:21]    Urinalysis - [06-02-20 @ 04:08]      Color Yellow / Appearance Clear / SG 1.010 / pH 5.0      Gluc Negative / Ketone Negative  / Bili Negative / Urobili Negative       Blood Moderate / Protein 15 / Leuk Est Small / Nitrite Negative      RBC Negative / WBC 3-5 / Hyaline  / Gran  / Sq Epi  / Non Sq Epi Few / Bacteria Occasional      Iron 27, TIBC 307, %sat 9      [06-03-20 @ 06:56]  Ferritin 51      [06-03-20 @ 06:56]

## 2020-06-05 NOTE — DIETITIAN INITIAL EVALUATION ADULT. - OTHER INFO
Pt with h/o dementia, Afib on Eliquis, HLD , Hypothyroidism and Depression who was BIBA from [facility] living [facility] after CXR was concerning for PNA .  Pt admitted with acute hypoxemic respiratory failure due to decompensated CHF, hyperkalemia.   Pt sleeping at time of visit, breakfast at bedside.  Pt with fair to good po intake at meals per RN flowsheets.  Unable to determine nutrition hx at this time.

## 2020-06-05 NOTE — PROGRESS NOTE ADULT - ASSESSMENT
1) Acute kidney injury on ckd- unknown baseline stage- Renal US with atrophic right kidney; b/l cortical thickening and increased echogenicity; no hydronephrosis  2) Hyperkalemia; s/p insulin, D50, bicarb and lasix; now on Lokelma  3) CHFrEF- TTE with preserved EF, moderate TR, moderate AS, mild pulm HTN  4) Anemia of CKD with superimposed iron def        Scr improving with diuresis  Continue diuretics  Continue IV iron, s/p Epo [id number] U on 06/04  Monitor SCr, lytes, UOP

## 2020-06-05 NOTE — DISCHARGE NOTE NURSING/CASE MANAGEMENT/SOCIAL WORK - PATIENT PORTAL LINK FT
You can access the FollowMyHealth Patient Portal offered by [facility] by registering at the following website: [email or web address]. By joining [employer]’s FollowMyHealth portal, you will also be able to view your health information using other applications (apps) compatible with our system.

## 2020-06-05 NOTE — DISCHARGE NOTE PROVIDER - NSDCCPCAREPLAN_GEN_ALL_CORE_FT
PRINCIPAL DISCHARGE DIAGNOSIS  Diagnosis: Hyperkalemia  Assessment and Plan of Treatment: resolved; likely due to CKD  continue lasix  adhere to low potassium diet  please repeat labs in 2 days and ensure electrolytes remain stable  please schedule a follow up appointment with your outpatient nephrologist within 1 week      SECONDARY DISCHARGE DIAGNOSES  Diagnosis: Anemia  Assessment and Plan of Treatment: Hb 8.5  Patient received IV iron and Procrit on 6/4  please repeat labs in 2 days  goal Hb in CKD is 10  please follow up with nephrology for continued procrit injections    Diagnosis: Chronic atrial fibrillation  Assessment and Plan of Treatment: continue home medications and follow up with cardiology within 2 weeks    Diagnosis: CHF (congestive heart failure)  Assessment and Plan of Treatment: please continue lasix 40 mg daily and adhere to low sodium diet & fluid restriction  follow up with cardiology within 2 weeks    Diagnosis: Acute renal failure, unspecified acute renal failure type  Assessment and Plan of Treatment: superimposed on CKD  creatinine improved to 2.05  continue lasix and repeat labs in 2 days  outpatient follow up with nephrology within 1 week as above

## 2020-06-05 NOTE — DISCHARGE NOTE PROVIDER - CARE PROVIDERS DIRECT ADDRESSES
,[email or web address],[email or web address]

## 2020-06-05 NOTE — DIETITIAN INITIAL EVALUATION ADULT. - PERTINENT LABORATORY DATA
06-04 Na141 mmol/L Glu 166 mg/dL<H> K+ 3.8 mmol/L Cr  2.05 mg/dL<H> BUN 51.0 mg/dL<H> Phos 4.9 mg/dL<H> Alb n/a   PAB n/a

## 2020-06-06 LAB
CULTURE RESULTS: SIGNIFICANT CHANGE UP
CULTURE RESULTS: SIGNIFICANT CHANGE UP
SPECIMEN SOURCE: SIGNIFICANT CHANGE UP
SPECIMEN SOURCE: SIGNIFICANT CHANGE UP

## 2023-06-27 NOTE — ED ADULT TRIAGE NOTE - BMI (KG/M2)
[FreeTextEntry1] : 70 yo female presenting with right anterior talar avulsion, ATFL ankle sprain, foot sprain. Recommend Non-Surgical management\par -RLE WBAT, may ween off and d/c post-op shoe once tolerable\par -Activities as tolerated, avoid strenuous/impact related activities\par -Rest, ice, compression, elevation, NSAIDs PRN for pain. \par -PT was discussed with patient to increase improvement, patient expressed disinterest in PT at this time. At this time patient given PT Rx, will start at this time.\par -Will wear compression socks as well\par -All questions answered\par -F/u 6 weeks\par \par Entered by Umer Mcclendon acting as scribe.\par Dr. Brown Attestation\par The documentation recorded by the scribe, in my presence, accurately reflects the service I, Dr. Brown, personally performed, and the decisions made by me with my edits as appropriate.\par \par  25.7

## 2024-09-06 NOTE — ED PROVIDER NOTE - CADM POA PRESS ULCER LOCATION
RT EQUIPMENT DEVICE RELATED - SKIN ASSESSMENT    RT Medical Equipment/Device:     Nasal Cannula    Skin Assessment:      Cheek:  Intact  Nares:  Intact    Device Skin Pressure Protection:  Pressure points protected    Nurse Notification:  Ernestina Frye, RRT    sacrum